# Patient Record
Sex: MALE | Race: WHITE | NOT HISPANIC OR LATINO | Employment: OTHER | URBAN - METROPOLITAN AREA
[De-identification: names, ages, dates, MRNs, and addresses within clinical notes are randomized per-mention and may not be internally consistent; named-entity substitution may affect disease eponyms.]

---

## 2018-01-16 NOTE — MISCELLANEOUS
Message  call from dr Cheryle Peterson , patient needs to be admitted to [facility] for pain control and poss infection, scheduled for next week amp  called [facility] ER and told them to call our resident upon arrival       Active Problems    1  Anemia (285 9) (D64 9)   2  Chronic kidney disease (CKD), stage II (mild) (585 2) (N18 2)   3  Hypertension (401 9) (I10)   4  Microscopic hematuria (599 72) (R31 2)   5  MPA (microscopic polyangiitis) (446 0) (M31 7)   6  Post-operative state (V45 89) (Z98 89)   7  Proteinuria (791 0) (R80 9)   8  Status post below knee amputation of left lower extremity (V49 75) (Z89 512)   9  Vasculitis, ANCA positive (447 6) (I77 6)   10  Vitamin D deficiency (268 9) (E55 9)    Current Meds   1  Biotin 5000 MCG Oral Tablet; take 1 capsule daily; Therapy: 60XZW6596 to Recorded   2  LORazepam 0 5 MG Oral Tablet; TAKE 1 TABLET 3 TIMES DAILY; Therapy: 03LQJ9555 to Recorded   3  Losartan Potassium 100 MG Oral Tablet; TAKE 1 TABLET DAILY; Therapy: 62GAV9658 to (Evaluate:08Oso5626)  Requested for: 38TAO6824; Last   Rx:[id number] Ordered   4  Magnesium Oxide 400 MG Oral Capsule; take 1 tab od; Therapy: 41KZY5266 to Recorded   5  Mirtazapine 15 MG Oral Tablet; TAKE 1 TABLET AT BEDTIME; Therapy: 99AXB0476 to Recorded   6  PredniSONE 5 MG Oral Tablet; take one tablet by mouth every day; Therapy: 54ZDG7506 to (Evaluate:69Zym2211) Recorded   7  Spironolactone 25 MG Oral Tablet; Take 1 tablet daily; Therapy: 23EFR6344 to ([phone])  Requested for: 91Qdh5008; Last   Rx:[id number]; Status: ACTIVE - Renewal Denied Ordered   8  Torsemide 10 MG Oral Tablet; take 1 tablet by mouth every day; Therapy: 36MLO1450 to (Evaluate:66Rmh8297)  Requested for: 73OLX3227; Last   Rx:92Ilz8870 Ordered   9  Vitamin B-12 1000 MCG Oral Tablet; TAKE 1 TABLET DAILY AS DIRECTED; Therapy: 04TTC6064 to Recorded   10  Vitamin D3 09975 UNIT Oral Capsule; take 1 tabl;et weekly; Therapy: 61UCI8526 to Recorded    Allergies    1  No Known Drug Allergies    Signatures   Electronically signed by :  Gris Wadsworth, ; May  6 2016 12:09PM EST                       (Author)

## 2019-10-28 LAB
CREAT ?TM UR-SCNC: 64 UMOL/L
EXT PROTEIN URINE: 28
PROT/CREAT UR: 438 MG/G{CREAT}

## 2022-03-09 ENCOUNTER — HOSPITAL ENCOUNTER (EMERGENCY)
Facility: HOSPITAL | Age: 62
Discharge: HOME | End: 2022-03-09
Attending: EMERGENCY MEDICINE | Admitting: EMERGENCY MEDICINE
Payer: COMMERCIAL

## 2022-03-09 VITALS
WEIGHT: 183 LBS | HEART RATE: 72 BPM | OXYGEN SATURATION: 97 % | HEIGHT: 72 IN | RESPIRATION RATE: 16 BRPM | DIASTOLIC BLOOD PRESSURE: 95 MMHG | BODY MASS INDEX: 24.79 KG/M2 | TEMPERATURE: 98.3 F | SYSTOLIC BLOOD PRESSURE: 183 MMHG

## 2022-03-09 DIAGNOSIS — R03.0 ELEVATED BLOOD PRESSURE READING: Primary | ICD-10-CM

## 2022-03-09 DIAGNOSIS — I10 PRIMARY HYPERTENSION: ICD-10-CM

## 2022-03-09 LAB
ALBUMIN SERPL BCP-MCNC: 4 G/DL (ref 3.5–5)
ALP SERPL-CCNC: 64 U/L (ref 34–104)
ALT SERPL W P-5'-P-CCNC: 9 U/L (ref 7–52)
ANION GAP SERPL CALCULATED.3IONS-SCNC: 10 MMOL/L (ref 4–13)
AST SERPL W P-5'-P-CCNC: 15 U/L (ref 13–39)
BASOPHILS # BLD AUTO: 0.13 THOUSANDS/ΜL (ref 0–0.1)
BASOPHILS NFR BLD AUTO: 2 % (ref 0–1)
BILIRUB SERPL-MCNC: 0.44 MG/DL (ref 0.2–1)
BUN SERPL-MCNC: 14 MG/DL (ref 5–25)
CALCIUM SERPL-MCNC: 9 MG/DL (ref 8.4–10.2)
CHLORIDE SERPL-SCNC: 102 MMOL/L (ref 96–108)
CO2 SERPL-SCNC: 24 MMOL/L (ref 21–32)
CREAT SERPL-MCNC: 0.74 MG/DL (ref 0.6–1.3)
EOSINOPHIL # BLD AUTO: 0.24 THOUSAND/ΜL (ref 0–0.61)
EOSINOPHIL NFR BLD AUTO: 3 % (ref 0–6)
ERYTHROCYTE [DISTWIDTH] IN BLOOD BY AUTOMATED COUNT: 12.1 % (ref 11.6–15.1)
GFR SERPL CREATININE-BSD FRML MDRD: 100 ML/MIN/1.73SQ M
GLUCOSE SERPL-MCNC: 84 MG/DL (ref 65–140)
HCT VFR BLD AUTO: 46.8 % (ref 36.5–49.3)
HGB BLD-MCNC: 16.8 G/DL (ref 12–17)
IMM GRANULOCYTES # BLD AUTO: 0.03 THOUSAND/UL (ref 0–0.2)
IMM GRANULOCYTES NFR BLD AUTO: 0 % (ref 0–2)
LYMPHOCYTES # BLD AUTO: 1.24 THOUSANDS/ΜL (ref 0.6–4.47)
LYMPHOCYTES NFR BLD AUTO: 15 % (ref 14–44)
MCH RBC QN AUTO: 30.5 PG (ref 26.8–34.3)
MCHC RBC AUTO-ENTMCNC: 35.9 G/DL (ref 31.4–37.4)
MCV RBC AUTO: 85 FL (ref 82–98)
MONOCYTES # BLD AUTO: 0.81 THOUSAND/ΜL (ref 0.17–1.22)
MONOCYTES NFR BLD AUTO: 10 % (ref 4–12)
NEUTROPHILS # BLD AUTO: 5.6 THOUSANDS/ΜL (ref 1.85–7.62)
NEUTS SEG NFR BLD AUTO: 70 % (ref 43–75)
NRBC BLD AUTO-RTO: 0 /100 WBCS
PLATELET # BLD AUTO: 216 THOUSANDS/UL (ref 149–390)
PMV BLD AUTO: 9.2 FL (ref 8.9–12.7)
POTASSIUM SERPL-SCNC: 4 MMOL/L (ref 3.5–5.3)
PROT SERPL-MCNC: 7 G/DL (ref 6.4–8.4)
RBC # BLD AUTO: 5.5 MILLION/UL (ref 3.88–5.62)
SODIUM SERPL-SCNC: 136 MMOL/L (ref 135–147)
WBC # BLD AUTO: 8.05 THOUSAND/UL (ref 4.31–10.16)

## 2022-03-09 PROCEDURE — 36415 COLL VENOUS BLD VENIPUNCTURE: CPT | Performed by: EMERGENCY MEDICINE

## 2022-03-09 PROCEDURE — 99283 EMERGENCY DEPT VISIT LOW MDM: CPT

## 2022-03-09 PROCEDURE — 85025 COMPLETE CBC W/AUTO DIFF WBC: CPT | Performed by: EMERGENCY MEDICINE

## 2022-03-09 PROCEDURE — 99285 EMERGENCY DEPT VISIT HI MDM: CPT | Performed by: EMERGENCY MEDICINE

## 2022-03-09 PROCEDURE — 80053 COMPREHEN METABOLIC PANEL: CPT | Performed by: EMERGENCY MEDICINE

## 2022-03-09 PROCEDURE — 93005 ELECTROCARDIOGRAM TRACING: CPT

## 2022-03-09 RX ORDER — AMLODIPINE BESYLATE 5 MG/1
5 TABLET ORAL ONCE
Status: COMPLETED | OUTPATIENT
Start: 2022-03-09 | End: 2022-03-09

## 2022-03-09 RX ORDER — AMLODIPINE BESYLATE 5 MG/1
5 TABLET ORAL DAILY
Qty: 30 TABLET | Refills: 0 | Status: SHIPPED | OUTPATIENT
Start: 2022-03-09 | End: 2022-06-23 | Stop reason: SDUPTHER

## 2022-03-09 RX ADMIN — AMLODIPINE BESYLATE 5 MG: 5 TABLET ORAL at 19:41

## 2022-03-09 NOTE — ED PROVIDER NOTES
[address] NOTE      Pt Name: Adolfo Mistry  MRN: [id number]  YOB: 1960  Date of evaluation:  3/9/2022  Provider: Malvin Dowling MD    CHIEF COMPLAINT     Chief Complaint   Patient presents with    Hypertension     pt arriving stating he had a home health aid visit today he gets them yearly and his bp was in the 200's  Pt admits to not taking his bp medicine in a couple of months          HISTORY OF PRESENT ILLNESS  (LOCATION/SYMPTOM, TIMING/ONSET, CONTEXT/SETTING, QUALITY, DURATION, MODIFYING FACTORS, SEVERITY)   Note limiting factors  Adolfo Mistry is a 62 y o  male who presents to the emergency department for hypertension  Patient ran out of his hypertensive medications about 3 months ago  He has not had them filled or been taking any meds since that time  He does tell me that he was taking his blood pressure occasionally  He has home health come out from time to time  They took his blood pressure and noted it was elevated  They recommended that he follow-up in urgent care  Patient states that he went over there, but they were concerned about his pressure had sent him here to be evaluated  On arrival, patient's pressure is elevated  He is not having any symptoms  He denies headache  He denies chest pain or shortness of breath associated with the elevated blood pressure  He denies any weakness in the extremity  HPI    Nursing notes were reviewed  REVIEW OF SYSTEMS  (2-9 systems for level 4, ten or more for level 5)     Review of Systems   Constitutional: Negative for chills and fever  HENT: Negative for congestion and sore throat  Eyes: Negative for pain and visual disturbance  Respiratory: Negative for cough and shortness of breath  Cardiovascular: Negative for chest pain and palpitations  Gastrointestinal: Negative for abdominal pain, nausea and vomiting  Genitourinary: Negative for dysuria and frequency  Musculoskeletal: Negative for arthralgias and back pain  Skin: Negative for color change and rash  Neurological: Negative for light-headedness and headaches  PAST MEDICAL HISTORY     Past Medical History:   Diagnosis Date    ESRD (end stage renal disease) ([facility] [address] )     from vasculitis- now good gfr  off of HD    Hyperlipidemia     Hypertension     Hyperthyroidism          SURGICAL HISTORY     Past Surgical History:   Procedure Laterality Date    AMPUTATION ABOVE KNEE (AKA)      CHOLECYSTECTOMY      IR CHOLANGIOGRAM/PTC WITHOUT DRAIN  3/12/2014    TONSILLECTOMY      US GUIDANCE  1/30/2014         CURRENT MEDICATIONS     Discharge Medication List as of 3/9/2022  7:33 PM            ALLERGIES   Amoxicillin-pot clavulanate      SOCIAL HISTORY     Social History     Socioeconomic History    Marital status:      Spouse name: None    Number of children: None    Years of education: None    Highest education level: None   Occupational History    None   Tobacco Use    Smoking status: Current Every Day Smoker     Packs/day: 0 50     Types: Cigarettes    Smokeless tobacco: Never Used   Substance and Sexual Activity    Alcohol use: Not Currently    Drug use: Not Currently    Sexual activity: None   Other Topics Concern    None   Social History Narrative    None     Social Determinants of Health     Financial Resource Strain: Not on file   Food Insecurity: Not on file   Transportation Needs: Not on file   Physical Activity: Not on file   Stress: Not on file   Social Connections: Not on file   Intimate Partner Violence: Not on file   Housing Stability: Not on file       Screenings       PHYSICAL EXAM  (Up to 7 for level 4, 8 or more for level 5)     Physical Exam  Vitals and nursing note reviewed  Constitutional:       Appearance: Normal appearance  He is well-developed  He is not ill-appearing  HENT:      Head: Normocephalic and atraumatic        Nose: Nose normal       Mouth/Throat: Mouth: Mucous membranes are moist       Pharynx: Oropharynx is clear  Eyes:      Extraocular Movements: Extraocular movements intact  Conjunctiva/sclera: Conjunctivae normal       Pupils: Pupils are equal, round, and reactive to light  Cardiovascular:      Rate and Rhythm: Normal rate and regular rhythm  Heart sounds: No murmur heard  Pulmonary:      Effort: Pulmonary effort is normal  No respiratory distress  Breath sounds: Normal breath sounds  Abdominal:      General: Abdomen is flat  Bowel sounds are normal  There is no distension  Palpations: Abdomen is soft  Tenderness: There is no abdominal tenderness  Musculoskeletal:         General: Normal range of motion  Cervical back: Normal range of motion and neck supple  Right lower leg: No edema  Left lower leg: No edema  Skin:     General: Skin is warm and dry  Capillary Refill: Capillary refill takes less than 2 seconds  Neurological:      General: No focal deficit present  Mental Status: He is alert and oriented to person, place, and time  Cranial Nerves: No cranial nerve deficit  Psychiatric:         Mood and Affect: Mood normal          Behavior: Behavior normal          Thought Content:  Thought content normal          Diagnostic results   Radiology:  Non plain film images suggest CT, ultrasound and MRI are read by the radiologist   Plain radiographic images are visualized and preliminarily interpreted by the emergency physician see below findings:        EKG:  All EKGs are interpreted by the emergency department physician who either signs or cosigned discharge in the absence of a cardiologist       ECG 12 Lead Documentation Only    Date/Time: 3/9/2022 6:59 PM  Performed by: Maco Avendaño MD  Authorized by: Maco Avendaño MD     Indications / Diagnosis:  Essential HTN  ECG reviewed by me, the ED Provider: yes    Patient location:  ED  Previous ECG:     Previous ECG: Unavailable    Comparison to cardiac monitor: Yes    Rate:     ECG rate:  76    ECG rate assessment: normal    Rhythm:     Rhythm: sinus rhythm    Ectopy:     Ectopy: none    QRS:     QRS axis:  Left  Conduction:     Conduction: normal    ST segments:     ST segments:  Normal        No orders to display         LABS:  Results Reviewed     Procedure Component Value Units Date/Time    Comprehensive metabolic panel [[id number]] Collected: 03/09/22 1726    Lab Status: Final result Specimen: Blood from Hand, Right Updated: 03/09/22 1800     Sodium 136 mmol/L      Potassium 4 0 mmol/L      Chloride 102 mmol/L      CO2 24 mmol/L      ANION GAP 10 mmol/L      BUN 14 mg/dL      Creatinine 0 74 mg/dL      Glucose 84 mg/dL      Calcium 9 0 mg/dL      AST 15 U/L      ALT 9 U/L      Alkaline Phosphatase 64 U/L      Total Protein 7 0 g/dL      Albumin 4 0 g/dL      Total Bilirubin 0 44 mg/dL      eGFR 100 ml/min/1 73sq m     Narrative:      [place] guidelines for Chronic Kidney Disease (CKD):     Stage 1 with normal or high GFR (GFR > 90 mL/min/1 73 square meters)    Stage 2 Mild CKD (GFR = 60-89 mL/min/1 73 square meters)    Stage 3A Moderate CKD (GFR = 45-59 mL/min/1 73 square meters)    Stage 3B Moderate CKD (GFR = 30-44 mL/min/1 73 square meters)    Stage 4 Severe CKD (GFR = 15-29 mL/min/1 73 square meters)    Stage 5 End Stage CKD (GFR <15 mL/min/1 73 square meters)  Note: GFR calculation is accurate only with a steady state creatinine    CBC and differential [[id number]]  (Abnormal) Collected: 03/09/22 1726    Lab Status: Final result Specimen: Blood from Hand, Right Updated: 03/09/22 1733     WBC 8 05 Thousand/uL      RBC 5 50 Million/uL      Hemoglobin 16 8 g/dL      Hematocrit 46 8 %      MCV 85 fL      MCH 30 5 pg      MCHC 35 9 g/dL      RDW 12 1 %      MPV 9 2 fL      Platelets 548 Thousands/uL      nRBC 0 /100 WBCs      Neutrophils Relative 70 %      Immat GRANS % 0 %      Lymphocytes Relative 15 %      Monocytes Relative 10 %      Eosinophils Relative 3 %      Basophils Relative 2 %      Neutrophils Absolute 5 60 Thousands/µL      Immature Grans Absolute 0 03 Thousand/uL      Lymphocytes Absolute 1 24 Thousands/µL      Monocytes Absolute 0 81 Thousand/µL      Eosinophils Absolute 0 24 Thousand/µL      Basophils Absolute 0 13 Thousands/µL           All other labs were within normal range or not returned as of this dictation  EMERGENCY DEPARTMENT COURSE AND DIFFERENTIAL DIAGNOSIS/MDM:   VITALS:   Vitals:    03/09/22 1747 03/09/22 1851 03/09/22 1900 03/09/22 1945   BP: (!) 196/102 (!) 188/94 (!) 199/96 (!) 183/95   BP Location: Right arm Right arm Right arm Right arm   Pulse: 66 69 66 72   Resp: 16 18 16 16   Temp:  98 3 °F (36 8 °C)     TempSrc:  Oral     SpO2: 98% 97% 96% 97%   Weight:       Height:           MDM  Number of Diagnoses or Management Options  Elevated blood pressure reading: new and requires workup  Primary hypertension: new and requires workup  Diagnosis management comments: Patient presented this evening for elevated blood pressure readings  At no point has he had chest pain, shortness of breath, headache, weakness in any extremity or vision changes  He is asymptomatic  As he was in the ED his blood pressure has fluctuated  He has had essentially no complaints  Blood work this evening is unremarkable  He tells me that he is basically out of his hypertensive medicine  I am going to start him back on amlodipine at 5 mg  I told him he needs to call his primary care  After careful review history, physical and supporting data, there is a very low suspicion for life or limb threatening cause the patient's essential hypertension  The patient's symptoms have improved from presentation and appears clinically well  Patient re-examined prior to discharge and appears improved    Patient has been encouraged to follow up with his/her PCP and return to the ED with any lack of improvement or worsening symptoms  The patient's questions regarding the workup and plan were invited and answered  The guardian/patient states understanding and agreement with discharge planning  Patient Progress  Patient progress: stable      Reassessment:      Medications   amLODIPine (NORVASC) tablet 5 mg (5 mg Oral Given 3/9/22 1941)       CONSULTS:    Unless otherwise noted below none  FINAL IMPRESSION     1  Elevated blood pressure reading    2   Primary hypertension            DISPOSITION/PLAN         PATIENT REFERRED TO:  Gianna Rene MD  [address] [place] [address] [phone]    Call in 2 days  As needed        DISCHARGE MEDICATIONS:  Discharge Medication List as of 3/9/2022  7:33 PM      START taking these medications    Details   amLODIPine (NORVASC) 5 mg tablet Take 1 tablet (5 mg total) by mouth daily, Starting Wed 3/9/2022, Normal                 (Please note that the portions of the snow were completed with voice recognition program   Efforts were made to admit the dictations but occasionally words are missed transcribed )    Forest Blount MD (electronically signed) emergency physician                             Forest Blount MD  03/10/22 0004

## 2022-03-11 LAB
ATRIAL RATE: 76 BPM
ATRIAL RATE: 76 BPM
P AXIS: 73 DEGREES
P AXIS: 73 DEGREES
PR INTERVAL: 171 MS
PR INTERVAL: 171 MS
QRS AXIS: -7 DEGREES
QRS AXIS: -7 DEGREES
QRSD INTERVAL: 96 MS
QRSD INTERVAL: 96 MS
QT INTERVAL: 390 MS
QT INTERVAL: 390 MS
QTC INTERVAL: 439 MS
QTC INTERVAL: 439 MS
T WAVE AXIS: 58 DEGREES
T WAVE AXIS: 58 DEGREES
VENTRICULAR RATE: 76 BPM
VENTRICULAR RATE: 76 BPM

## 2022-03-11 PROCEDURE — 93010 ELECTROCARDIOGRAM REPORT: CPT | Performed by: INTERNAL MEDICINE

## 2022-04-26 ENCOUNTER — TELEPHONE (OUTPATIENT)
Dept: NEPHROLOGY | Facility: CLINIC | Age: 62
End: 2022-04-26

## 2022-04-26 NOTE — TELEPHONE ENCOUNTER
New Patient Intake Form   Patient Details   Froy Amezquita     1960     [id number]     Appointment Information   Who is calling to schedule? If not patient, what is callers name? Patient    Referring Provider  self   Referring Provider Number n/a   Reason for Appt (Diagnosis) Abnormal labs   Is patient aware of why they are being referred? yes   Does Patient have labs done at [place] [address]? If not, where do they go? no - [facility]   Has patient had labs / urine work done? List date of most recent lab / urine work yes - April 2022   Has patient had a BMP & CBC done in the past 2 years? If so, list the date yes    Has patient been hospitalized recently? If yes, list name and location of hospital they were in no    Has patient been seen by a Nephrologist before? If yes, list name, location and phone number  yes- Dr Tee Bean   Has patient been see by another Specialty before (ex  Neurology, urology, cardiology)? If yes, please list name, and specialty No - will be seeing urology    Has the patient had imaging done? If so, list the most recent date and type of imaging no    Does the patient has a stone analysis report if history of kidney stones?  no   Appointment Details   Is there a referral on file? no    Appointment Date  6/23   [facility]

## 2022-06-23 ENCOUNTER — OFFICE VISIT (OUTPATIENT)
Dept: NEPHROLOGY | Facility: CLINIC | Age: 62
End: 2022-06-23
Payer: MEDICARE

## 2022-06-23 VITALS
SYSTOLIC BLOOD PRESSURE: 160 MMHG | BODY MASS INDEX: 25.33 KG/M2 | HEART RATE: 72 BPM | DIASTOLIC BLOOD PRESSURE: 82 MMHG | HEIGHT: 72 IN | WEIGHT: 187 LBS

## 2022-06-23 DIAGNOSIS — R31.29 MICROSCOPIC HEMATURIA: ICD-10-CM

## 2022-06-23 DIAGNOSIS — I10 PRIMARY HYPERTENSION: ICD-10-CM

## 2022-06-23 DIAGNOSIS — M31.7 MICROSCOPIC POLYANGIITIS (HCC): Primary | ICD-10-CM

## 2022-06-23 DIAGNOSIS — R80.1 PERSISTENT PROTEINURIA: ICD-10-CM

## 2022-06-23 PROCEDURE — 99204 OFFICE O/P NEW MOD 45 MIN: CPT | Performed by: INTERNAL MEDICINE

## 2022-06-23 RX ORDER — AMLODIPINE BESYLATE 10 MG/1
10 TABLET ORAL DAILY
Qty: 30 TABLET | Refills: 5 | Status: SHIPPED | OUTPATIENT
Start: 2022-06-23

## 2022-06-23 RX ORDER — TAMSULOSIN HYDROCHLORIDE 0.4 MG/1
0.4 CAPSULE ORAL EVERY 24 HOURS
COMMUNITY

## 2022-06-23 RX ORDER — ATORVASTATIN CALCIUM 20 MG/1
20 TABLET, FILM COATED ORAL EVERY 24 HOURS
COMMUNITY

## 2022-06-23 RX ORDER — OLMESARTAN MEDOXOMIL 20 MG/1
1 TABLET ORAL DAILY
COMMUNITY
End: 2022-07-24 | Stop reason: SDUPTHER

## 2022-06-23 NOTE — PROGRESS NOTES
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 in 2014-15, status post renal biopsy that time and also required briefly dialysis  -as per chart review, patient also had plasma exchange, steroids and cyclophosphamide that time   -since then overall had significant renal recovery with most recent creatinine 0 7 in April 2022  -unfortunately he lost insurance and was not taking any medications for 1 5 years up until last couple of months when he restarted taking all medications  -currently remains on azathioprine which was restarted in May 2022 by Rheumatology    -UA in the office today shows 1+ proteinuria, 5 to 10 blood  -recent UA in April 2022 shows multiple WBCs, 3 to 10 RBCs, positive leukocyte esterase, 3+ proteinuria   -most recent PR3 antibody 46 in April 2022 (prior 98 in 2016)  -given more recent UA showing microscopic hematuria along with proteinuria with elevated PR3 antibody, raises suspicion for ongoing ANCA vasculitis activity  -fortunately renal function remains stable  -will do repeat serological workup including ANCA panel, KAYLEE, complements, hep panel, repeat CMP, SPEP, UPEP  -if continued to have persistent hematuria/proteinuria along with positive PR3 antibody, may consider renal biopsy    Microscopic hematuria/proteinuria  -further workup as above  -suspicious for ANCA vasculitis, if continued to have microscopic hematuria along with positive PR3 antibody, proceed with renal biopsy   -would like to have better BP control prior to renal biopsy    Hypertension  -blood pressure above goal in the office today  -currently on amlodipine 10 mg daily, olmesartan 20 mg daily, Flomax  -patient has not done repeat lab since starting Olmesartan recently  -we will do CMP ASAP and if renal function remains stable, plan to increase olmesartan further  -he has apparent BP machine at home although does not check BP on a regular basis  Strongly recommended to check BP and call back if BP remains persistently greater than 140/90  Also bring BP machine during next office visit  -salt restricted diet recommended     Microscopic polyangiitis, follows with Rheumatology Dr Mariaelena Benitez  -now restarted on azathioprine in May 22    HISTORY OF PRESENT ILLNESS:  Arden Cornejo is a 65y o  year old male with medical issues of hypertension diagnosed recently, hyperlipidemia, history of microscopic polyangiitis, mononeuritis multiplex, episode of SHONNA/RPGN requiring dialysis in 2014-15, steroid induced hyperglycemia, not on any antidiabetic medications currently, history of kidney stones requiring lithotripsy in 2010, left AKA in 2016 who presents for initial consultation for hematuria, proteinuria  Old medical records including prior lab values, rheumatology notes were reviewed  Patient apparently had kidney biopsy when he had episode of SHONNA requiring dialysis in 2014 15  Also required steroids, cyclophosphamide, plasma exchange that time  Since then had good renal recovery with most recent creatinine overall stable as mentioned above  There is also documented significant noncompliance with taking meds, following appointments and also labs  Patient mentions that he lost insurance for about 1 5 years and was not taking any medications up until last couple of months ago when he restarted taking all medications after receiving his insurance back  He was restarted on azathioprine by Rheumatology recently  He admits to smoking half pack per day  Also was found to have elevated blood pressure recently and also was started on Olmesartan  Does not check BP at home  Currently denies any urinary complaint      Denies any new skin rash     REVIEW OF SYSTEMS:    More than 10 point review of systems were obtained and discussed in length with the patient  Complete review of systems were negative / unremarkable except mentioned in the HPI section  PHYSICAL EXAM:  Vitals:    06/23/22 1500 06/23/22 1543   BP:  160/82   Pulse:  72   Weight: 84 8 kg (187 lb)    Height: 6' (1 829 m)      Body mass index is 25 36 kg/m²  Physical Exam  Vitals reviewed  Constitutional:       Appearance: He is well-developed  HENT:      Head: Normocephalic and atraumatic  Right Ear: External ear normal       Left Ear: External ear normal       Nose:      Comments: External examination of nose unremarkable  Eyes:      General: No scleral icterus  Conjunctiva/sclera: Conjunctivae normal    Cardiovascular:      Comments: S1, S2 present  Pulmonary:      Effort: Pulmonary effort is normal  No respiratory distress  Breath sounds: Normal breath sounds  No wheezing or rales  Abdominal:      General: Bowel sounds are normal  There is no distension  Palpations: Abdomen is soft  Tenderness: There is no abdominal tenderness  Musculoskeletal:         General: Deformity (History of left AKA, currently have prosthesis) present  Right lower leg: No edema  Lymphadenopathy:      Cervical: No cervical adenopathy  Skin:     Findings: No rash  Neurological:      Mental Status: He is alert and oriented to person, place, and time     Psychiatric:         Behavior: Behavior normal          PAST MEDICAL HISTORY:  Past Medical History:   Diagnosis Date    ESRD (end stage renal disease) ([facility] [address] )     from vasculitis- now good gfr  off of HD    Hyperlipidemia     Hypertension     Hyperthyroidism        PAST SURGICAL HISTORY:  Past Surgical History:   Procedure Laterality Date    AMPUTATION ABOVE KNEE (AKA)      CHOLECYSTECTOMY      IR CHOLANGIOGRAM/PTC WITHOUT DRAIN  3/12/2014    TONSILLECTOMY      US GUIDANCE  1/30/2014 ALLERGIES:  Allergies   Allergen Reactions    Amoxicillin-Pot Clavulanate Other (See Comments)       SOCIAL HISTORY:  Social History     Substance and Sexual Activity   Alcohol Use Not Currently     Social History     Substance and Sexual Activity   Drug Use Not Currently     Social History     Tobacco Use   Smoking Status Current Every Day Smoker    Packs/day: 0 50    Types: Cigarettes   Smokeless Tobacco Never Used       FAMILY HISTORY:  Family History   Problem Relation Age of Onset    Cirrhosis Father         liver        MEDICATIONS:    Current Outpatient Medications:     amLODIPine (NORVASC) 10 mg tablet, Take 1 tablet (10 mg total) by mouth daily, Disp: 30 tablet, Rfl: 5    atorvastatin (LIPITOR) 20 mg tablet, Take 20 mg by mouth every 24 hours, Disp: , Rfl:     azaTHIOprine 50 mg/mL in flavored oral syrup vehicle, Take 100 mg by mouth in the morning, Disp: , Rfl:     MULTIPLE VITAMIN PO, every 24 hours, Disp: , Rfl:     olmesartan (BENICAR) 20 mg tablet, Take 1 tablet by mouth daily, Disp: , Rfl:     tamsulosin (FLOMAX) 0 4 mg, Take 0 4 mg by mouth every 24 hours, Disp: , Rfl:     VITAMIN D PO, Take 1,000 Units by mouth every 24 hours, Disp: , Rfl:     Lab Results:   Results for orders placed or performed in visit on 04/22/22   Protein / creatinine ratio, urine   Result Value Ref Range    PROTEIN      EXT Creatinine Urine 155     EXTERNAL Ur Prot/Creat Ratio 1,523    Hepatitis C antibody   Result Value Ref Range    HEP C AB NEGATIVE      Portions of the record may have been created with voice recognition software  Occasional wrong word or "sound a like" substitutions may have occurred due to the inherent limitations of voice recognition software  Read the chart carefully and recognize, using context, where substitutions have occurred

## 2022-06-24 ENCOUNTER — HOSPITAL ENCOUNTER (OUTPATIENT)
Dept: ULTRASOUND IMAGING | Facility: HOSPITAL | Age: 62
Discharge: HOME | End: 2022-06-24
Attending: INTERNAL MEDICINE
Payer: MEDICARE

## 2022-06-24 DIAGNOSIS — R31.29 MICROSCOPIC HEMATURIA: ICD-10-CM

## 2022-06-24 DIAGNOSIS — R80.1 PERSISTENT PROTEINURIA: ICD-10-CM

## 2022-06-24 PROCEDURE — 76770 US EXAM ABDO BACK WALL COMP: CPT

## 2022-06-29 ENCOUNTER — TELEPHONE (OUTPATIENT)
Dept: NEPHROLOGY | Facility: CLINIC | Age: 62
End: 2022-06-29

## 2022-06-29 DIAGNOSIS — R80.1 PERSISTENT PROTEINURIA: Primary | ICD-10-CM

## 2022-06-29 DIAGNOSIS — R31.29 MICROSCOPIC HEMATURIA: ICD-10-CM

## 2022-06-29 DIAGNOSIS — I10 PRIMARY HYPERTENSION: ICD-10-CM

## 2022-06-29 NOTE — TELEPHONE ENCOUNTER
Please let him know that renal ultrasound shows no acute findings  Will discuss in more detail during office visit  Also I had prescribed multiple labs for him during office visit which he should do asap

## 2022-06-30 NOTE — TELEPHONE ENCOUNTER
Spoke with patient and advised: Please let him know that renal ultrasound shows no acute findings  Will discuss in more detail during office visit  Also I had prescribed multiple labs for him during office visit which he should do asap  [facility], patient stated he will get them done after the holiday

## 2022-07-06 ENCOUNTER — TELEPHONE (OUTPATIENT)
Dept: NEPHROLOGY | Facility: CLINIC | Age: 62
End: 2022-07-06

## 2022-07-06 NOTE — TELEPHONE ENCOUNTER
----- Message from Wood Hinkle MD sent at 7/6/2022  4:28 PM EDT -----  I saw some of his lab results came back but I do not see CMP and UA with microscopy that were done  He needs to do this along with other labs that he has already done  Not sure if labs can add on to existing specimen but if not, unfortunately patient will have to go again to lab to get CMP and UA with microscopy    Please let me know thank you

## 2022-07-08 LAB
ALBUMIN ?TM MFR UR ELPH: 68 %
ALBUMIN SERPL ELPH-MCNC: 4.2 G/DL (ref 3.8–4.8)
ALBUMIN/CREAT UR: 141 MCG/MG CREAT
ALPHA1 GLOB ?TM MFR UR ELPH: 6 %
ALPHA1 GLOB SERPL ELPH-MCNC: 0.3 G/DL (ref 0.2–0.3)
ALPHA2 GLOB ?TM MFR UR ELPH: 8 %
ALPHA2 GLOB SERPL ELPH-MCNC: 0.7 G/DL (ref 0.5–0.9)
ANA SER QL IF: NEGATIVE
B-GLOBULIN ?TM MFR UR ELPH: 13 %
BETA1 GLOB SERPL ELPH-MCNC: 0.5 G/DL (ref 0.4–0.6)
BETA2 GLOB SERPL ELPH-MCNC: 0.4 G/DL (ref 0.2–0.5)
C3 SERPL-MCNC: 115 MG/DL (ref 82–185)
C4 SERPL-MCNC: 31 MG/DL (ref 15–53)
CREAT UR-MCNC: 96 MG/DL (ref 20–320)
CREAT UR-MCNC: 96 MG/DL (ref 20–320)
GAMMA GLOB ?TM MFR UR ELPH: 7 %
GAMMA GLOB SERPL ELPH-MCNC: 0.9 G/DL (ref 0.8–1.7)
HAV AB SER QL IA: NORMAL
HBA1C MFR BLD: 6 % OF TOTAL HGB
HBV CORE AB SERPL QL IA: NORMAL
HBV SURFACE AB SER QL IA: NORMAL
HBV SURFACE AG SERPL QL IA: NORMAL
HCV AB S/CO SERPL IA: 0.02
HCV AB SERPL QL IA: NORMAL
MICROALBUMIN UR-MCNC: 13.5 MG/DL
MYELOPEROXIDASE AB SER IA-ACNC: <1 AI
PROT SERPL-MCNC: 7.1 G/DL (ref 6.1–8.1)
PROT UR-MCNC: 33 MG/DL (ref 5–25)
PROT/CREAT UR: 0.34 MG/MG CREAT (ref 0.02–0.13)
PROT/CREAT UR: 344 MG/G CREAT (ref 22–128)
PROTEINASE3 AB SER IA-ACNC: 40.3 AI

## 2022-07-11 NOTE — TELEPHONE ENCOUNTER
Can you please follow-up with patient regarding prior telephone call on 7/6/22? I wanted him to go for repeat CMP, UA with microscopy which he needs to do asap to make a plan whether he will need renal biopsy  Orders are already in the system

## 2022-07-11 NOTE — TELEPHONE ENCOUNTER
Called patient to get an update  Patient's mailbox was full no message was left  Will try calling back tomorrow

## 2022-07-19 ENCOUNTER — TELEPHONE (OUTPATIENT)
Dept: NEPHROLOGY | Facility: CLINIC | Age: 62
End: 2022-07-19

## 2022-07-19 NOTE — TELEPHONE ENCOUNTER
Can you please follow-up with him regarding doing CMP, UA with microscopy? This was recommended on telephone call note on 7/11/22 to do this

## 2022-07-21 ENCOUNTER — DOCUMENTATION (OUTPATIENT)
Dept: NEPHROLOGY | Facility: CLINIC | Age: 62
End: 2022-07-21

## 2022-07-21 LAB
ALBUMIN SERPL-MCNC: 4.2 G/DL (ref 3.6–5.1)
ALBUMIN/GLOB SERPL: 1.8 (CALC) (ref 1–2.5)
ALP SERPL-CCNC: 67 U/L (ref 35–144)
ALT SERPL-CCNC: 12 U/L (ref 9–46)
APPEARANCE UR: CLEAR
AST SERPL-CCNC: 13 U/L (ref 10–35)
BACTERIA UR QL AUTO: ABNORMAL /HPF
BILIRUB SERPL-MCNC: 0.6 MG/DL (ref 0.2–1.2)
BILIRUB UR QL STRIP: NEGATIVE
BILIRUB UR QL STRIP: NEGATIVE
BUN SERPL-MCNC: 17 MG/DL (ref 7–25)
BUN/CREAT SERPL: ABNORMAL (CALC) (ref 6–22)
CALCIUM SERPL-MCNC: 9.3 MG/DL (ref 8.6–10.3)
CHARACTER SMN: CLEAR
CHLORIDE SERPL-SCNC: 107 MMOL/L (ref 98–110)
CO2 SERPL-SCNC: 28 MMOL/L (ref 20–32)
COLOR UR: ABNORMAL
COLOR UR: YELLOW
CREAT SERPL-MCNC: 0.84 MG/DL (ref 0.7–1.35)
EXT GLUCOSE BLD: 125
EXTERNAL ALBUMIN: 4.2
EXTERNAL ALK PHOS: 71
EXTERNAL ALT: 13
EXTERNAL AST: 17
EXTERNAL BUN: 16
EXTERNAL CALCIUM: 9.4
EXTERNAL CHLORIDE: 106
EXTERNAL CREATININE: 0.8
EXTERNAL EGFR: 96
EXTERNAL POTASSIUM: 4.8
EXTERNAL SODIUM: 139
EXTERNAL T.BILIRUBIN: 0.8
EXTERNAL TOTAL PROTEIN: 7
GFR/BSA.PRED SERPLBLD CYS-BASED-ARV: 99 ML/MIN/1.73M2
GLOBULIN SER CALC-MCNC: 2.3 G/DL (CALC) (ref 1.9–3.7)
GLUCOSE SERPL-MCNC: 124 MG/DL (ref 65–99)
GLUCOSE UR QL STRIP: NEGATIVE
HCT VFR BLD AUTO: 47.7 % (ref 36.5–49.3)
HGB BLD-MCNC: 16.5 G/DL (ref 12–17)
HGB UR QL STRIP.AUTO: NEGATIVE
HGB UR QL STRIP: NEGATIVE
HYALINE CASTS #/AREA URNS LPF: ABNORMAL /LPF
KETONES UR QL STRIP: ABNORMAL
KETONES UR STRIP-MCNC: NEGATIVE MG/DL
LEUKOCYTE ESTERASE UR QL STRIP: NEGATIVE
NITRITE UR QL STRIP: NEGATIVE
NITRITE UR QL STRIP: NEGATIVE
PH SMN: 7.5 [PH]
PH UR STRIP: 5.5 [PH] (ref 5–8)
POTASSIUM SERPL-SCNC: 4.6 MMOL/L (ref 3.5–5.3)
PROT SERPL-MCNC: 6.5 G/DL (ref 6.1–8.1)
PROT UR QL STRIP: ABNORMAL
PROT UR STRIP-MCNC: ABNORMAL MG/DL
RBC # BLD AUTO: 5.31 MILLION/UL (ref 3.88–5.62)
RBC #/AREA URNS HPF: ABNORMAL /HPF
SODIUM SERPL-SCNC: 141 MMOL/L (ref 135–146)
SP GR UR STRIP.AUTO: 1.02 (ref 1–1.03)
SP GR UR STRIP: 1.02 (ref 1–1.03)
SQUAMOUS #/AREA URNS HPF: ABNORMAL /HPF
WBC # BLD AUTO: 6 THOUSAND/UL (ref 4.31–10.16)
WBC #/AREA URNS HPF: ABNORMAL /HPF

## 2022-07-24 ENCOUNTER — TELEPHONE (OUTPATIENT)
Dept: OTHER | Facility: HOSPITAL | Age: 62
End: 2022-07-24

## 2022-07-24 DIAGNOSIS — I10 PRIMARY HYPERTENSION: Primary | ICD-10-CM

## 2022-07-24 RX ORDER — OLMESARTAN MEDOXOMIL 20 MG/1
20 TABLET ORAL 2 TIMES DAILY
Qty: 60 TABLET | Refills: 5 | Status: SHIPPED | OUTPATIENT
Start: 2022-07-24 | End: 2022-07-26

## 2022-07-24 NOTE — TELEPHONE ENCOUNTER
His Cr remains stable and UA unremarkable  Please have him increase Olmesartan from 20 mg daily to 20 mg BID  Refill sent to pharmacy  I will discuss with his rheumatology meanwhile given persistent positive Ab level  He should bring home BP machine to office visit next month  Also monitor BP daily at home and bring all BP log to office visit

## 2022-07-25 NOTE — TELEPHONE ENCOUNTER
Attempted to call patient to advise: His Cr remains stable and UA unremarkable  Please have him increase Olmesartan from 20 mg daily to 20 mg BID  Refill sent to pharmacy  I will discuss with his rheumatology meanwhile given persistent positive Ab level  He should bring home BP machine to office visit next month  Also monitor BP daily at home and bring all BP log to office visit  Patient's mailbox was full  Will try again later

## 2022-07-26 ENCOUNTER — TELEPHONE (OUTPATIENT)
Dept: OTHER | Facility: HOSPITAL | Age: 62
End: 2022-07-26

## 2022-07-26 DIAGNOSIS — R31.29 MICROSCOPIC HEMATURIA: ICD-10-CM

## 2022-07-26 DIAGNOSIS — R80.1 PERSISTENT PROTEINURIA: ICD-10-CM

## 2022-07-26 DIAGNOSIS — I10 PRIMARY HYPERTENSION: Primary | ICD-10-CM

## 2022-07-26 NOTE — TELEPHONE ENCOUNTER
Attempted to call patient a second time to advise: His Cr remains stable and UA unremarkable  Please have him increase Olmesartan from 20 mg daily to 20 mg BID  Refill sent to pharmacy  I will discuss with his rheumatology meanwhile given persistent positive Ab level  He should bring home BP machine to office visit next month  Also monitor BP daily at home and bring all BP log to office visit       Patient's mailbox was full  Will try again later

## 2022-07-26 NOTE — TELEPHONE ENCOUNTER
Please let patient know that I had discussed with Dr Hilario Aiken (rheumatologist) regarding his most recent lab results  Given his renal function remains very stable with most recent urine sample shows no hematuria, overall much improving proteinuria, will continue to monitor all his lab parameters closely and if any worsening of hematuria/proteinuria/renal function, will consider renal biopsy that point  Please have him do repeat CMP, upc ratio, UA with microscopy, ANCA panel in November 2022  Of note, as per rheumatologist, his prior upc ratio 1 5 g in April 2022 with seems to have much improved to 0 3 g most recent

## 2022-08-10 ENCOUNTER — TELEPHONE (OUTPATIENT)
Dept: NEPHROLOGY | Facility: CLINIC | Age: 62
End: 2022-08-10

## 2022-08-10 NOTE — TELEPHONE ENCOUNTER
Appointment Confirmation   Person confirmed appointment with  If not patient, name of the person Child  Evorn Burn   Date and time of appointment 8/11  12:00    Patient acknowledged and will be at appointment? no    Did you advise the patient that they will need a urine sample if they are a new patient?  N/A    Did you advise the patient to bring their current medications for verification? (including any OTC) Yes    Additional Information

## 2022-08-11 ENCOUNTER — TELEPHONE (OUTPATIENT)
Dept: NEPHROLOGY | Facility: CLINIC | Age: 62
End: 2022-08-11

## 2022-08-11 ENCOUNTER — OFFICE VISIT (OUTPATIENT)
Dept: NEPHROLOGY | Facility: CLINIC | Age: 62
End: 2022-08-11
Payer: COMMERCIAL

## 2022-08-11 VITALS
SYSTOLIC BLOOD PRESSURE: 158 MMHG | BODY MASS INDEX: 25.19 KG/M2 | WEIGHT: 186 LBS | DIASTOLIC BLOOD PRESSURE: 84 MMHG | HEIGHT: 72 IN

## 2022-08-11 DIAGNOSIS — M31.7 MICROSCOPIC POLYANGIITIS (HCC): Primary | ICD-10-CM

## 2022-08-11 DIAGNOSIS — I10 PRIMARY HYPERTENSION: ICD-10-CM

## 2022-08-11 DIAGNOSIS — R80.1 PERSISTENT PROTEINURIA: ICD-10-CM

## 2022-08-11 DIAGNOSIS — R31.29 MICROSCOPIC HEMATURIA: ICD-10-CM

## 2022-08-11 PROCEDURE — 99214 OFFICE O/P EST MOD 30 MIN: CPT | Performed by: INTERNAL MEDICINE

## 2022-08-11 RX ORDER — ASPIRIN 81 MG/1
81 TABLET, CHEWABLE ORAL DAILY
COMMUNITY

## 2022-08-11 RX ORDER — OLMESARTAN MEDOXOMIL 40 MG/1
40 TABLET ORAL DAILY
Qty: 30 TABLET | Refills: 5 | Status: SHIPPED | OUTPATIENT
Start: 2022-08-11 | End: 2022-09-05

## 2022-08-11 RX ORDER — GABAPENTIN 100 MG/1
100 CAPSULE ORAL 2 TIMES DAILY
COMMUNITY

## 2022-08-11 NOTE — TELEPHONE ENCOUNTER
Patient was seen today 08/11 and Dr Bryson Murillo requested a 3 month follow up but he is completely booked  Please advise

## 2022-08-11 NOTE — PROGRESS NOTES
NEPHROLOGY OUTPATIENT PROGRESS NOTE   Darshana Borges 65 y o  male MRN: [id number]  DATE: 8/11/2022  Reason for visit:   Chief Complaint   Patient presents with    Follow-up    Proteinuria     ASSESSMENT and PLAN:  History of microscopic polyangiitis   -patient has history of SHONNA/RPGN secondary to microscopic polyangiitis in 2014-15, status post renal biopsy that time and also briefly required dialysis  -as per chart review, patient also had plasma exchange, steroids and cyclophosphamide that time   -since then overall had significant renal recovery with most recent creatinine 0 8 in July 2022   -currently remains on azathioprine which was restarted in May 2022 by Rheumatology  -UA in July 2022 shows no hematuria, 1+ proteinuria    -most recent PR3 antibody 40 in July 2022 <-- 55 in April 2022 (prior 98 in 2016)  -due to very stable renal function, no hematuria on more recent UA, improving proteinuria, we will continue to monitor conservatively  Also discussed with Rheumatology recently who agrees with this plan  -closely monitor Anca panel, UA with microscopy, BMP before next visit  -we will have low threshold for renal biopsy if has any worsening hematuria/proteinuria with renal failure   -recent serological workup shows normal complements, PR3 antibody 40 3, KAYLEE negative, SPEP negative in July 2022     Microscopic hematuria/proteinuria  -most recent UA shows no hematuria  -further workup as above  -monitor with repeat urine studies before next visit  Proteinuria  -urine microalbumin/creatinine ratio 141 mg in July 2022  -upc ratio 1 5 g in April 2022 improving to 0 3 g in July 2022  -increasing Olmesartan as below     Hypertension  -blood pressure improved on repeat check although still remains above goal   -recently Olmesartan was increased to 40 mg daily although patient still takes 20 mg daily   -continue current amlodipine 10 mg daily, increase Olmesartan to 40 mg daily     -has BP machine at home although does not check BP on a regular basis  Strongly recommended to check BP and call back if BP remains persistently greater than 140/90  Also bring BP machine during next office visit  -salt restricted diet recommended      Microscopic polyangiitis, follows with Rheumatology Dr Hilario Aiken  -now restarted on azathioprine in May 22    Diagnoses and all orders for this visit:    Microscopic polyangiitis ([address] )    Primary hypertension  -     olmesartan (BENICAR) 40 mg tablet; Take 1 tablet (40 mg total) by mouth daily    Persistent proteinuria  -     olmesartan (BENICAR) 40 mg tablet; Take 1 tablet (40 mg total) by mouth daily    Microscopic hematuria    Other orders  -     gabapentin (NEURONTIN) 100 mg capsule; Take 100 mg by mouth 2 (two) times a day  -     aspirin 81 mg chewable tablet; Chew 81 mg daily          SUBJECTIVE / HPI:  Jac Howard is a 65y o  year old male with medical issues of hypertension diagnosed recently, hyperlipidemia, history of microscopic polyangiitis, mononeuritis multiplex, episode of SHONNA/RPGN requiring dialysis in 2014-15, steroid induced hyperglycemia, not on any antidiabetic medications currently, history of kidney stones requiring lithotripsy in 2010, left AKA in 2016 who presents for regular follow-up of proteinuria/hematuria  Patient apparently had kidney biopsy when he had episode of SHONNA requiring dialysis in 2014 15  Also required steroids, cyclophosphamide, plasma exchange that time  Since then had good renal recovery with most recent creatinine overall stable as mentioned above    he lost insurance for about 1 5 years and was not taking any medications up until earlier this year when he restarted taking all medications after receiving his insurance back  Since last visit, recently his olmesartan was increased although he still continues to take 20 mg daily  Currently remains on azathioprine  Smokes half pack per day  Currently denies any urinary complaint    Denies any new skin rash  REVIEW OF SYSTEMS:  More than 10 point review of systems were obtained and discussed in length with the patient  Complete review of systems were negative / unremarkable except mentioned above  PHYSICAL EXAM:  Vitals:    08/11/22 1139 08/11/22 1210   BP: (!) 180/86 158/84   BP Location: Left arm    Patient Position: Sitting    Cuff Size: Standard    Weight: 84 4 kg (186 lb)    Height: 6' (1 829 m)      Body mass index is 25 23 kg/m²  Physical Exam  Vitals reviewed  Constitutional:       Appearance: He is well-developed  HENT:      Head: Normocephalic and atraumatic  Right Ear: External ear normal       Left Ear: External ear normal    Eyes:      General: No scleral icterus  Conjunctiva/sclera: Conjunctivae normal    Cardiovascular:      Comments: S1, S2 present  Pulmonary:      Effort: Pulmonary effort is normal  No respiratory distress  Breath sounds: Normal breath sounds  No wheezing or rales  Abdominal:      General: Bowel sounds are normal  There is no distension  Palpations: Abdomen is soft  Tenderness: There is no abdominal tenderness  Musculoskeletal:         General: Deformity (Status post left AKA, prosthesis present) present  No tenderness  Right lower leg: No edema  Left lower leg: No edema  Lymphadenopathy:      Cervical: No cervical adenopathy  Skin:     Findings: No rash  Neurological:      Mental Status: He is alert and oriented to person, place, and time     Psychiatric:         Behavior: Behavior normal          PAST MEDICAL HISTORY:  Past Medical History:   Diagnosis Date    ESRD (end stage renal disease) ([facility] [address] )     from vasculitis- now good gfr  off of HD    Hyperlipidemia     Hypertension     Hyperthyroidism        PAST SURGICAL HISTORY:  Past Surgical History:   Procedure Laterality Date    AMPUTATION ABOVE KNEE (AKA)      CHOLECYSTECTOMY      IR CHOLANGIOGRAM/PTC WITHOUT DRAIN  3/12/2014    TONSILLECTOMY      US GUIDANCE  1/30/2014       SOCIAL HISTORY:  Social History     Substance and Sexual Activity   Alcohol Use Not Currently     Social History     Substance and Sexual Activity   Drug Use Not Currently     Social History     Tobacco Use   Smoking Status Current Every Day Smoker    Packs/day: 0 50    Types: Cigarettes   Smokeless Tobacco Never Used       FAMILY HISTORY:  Family History   Problem Relation Age of Onset    Cirrhosis Father         liver        MEDICATIONS:    Current Outpatient Medications:     amLODIPine (NORVASC) 10 mg tablet, Take 1 tablet (10 mg total) by mouth daily, Disp: 30 tablet, Rfl: 5    aspirin 81 mg chewable tablet, Chew 81 mg daily, Disp: , Rfl:     atorvastatin (LIPITOR) 20 mg tablet, Take 20 mg by mouth every 24 hours, Disp: , Rfl:     azaTHIOprine 50 mg/mL in flavored oral syrup vehicle, Take 100 mg by mouth in the morning, Disp: , Rfl:     gabapentin (NEURONTIN) 100 mg capsule, Take 100 mg by mouth 2 (two) times a day, Disp: , Rfl:     MULTIPLE VITAMIN PO, every 24 hours, Disp: , Rfl:     olmesartan (BENICAR) 40 mg tablet, Take 1 tablet (40 mg total) by mouth daily, Disp: 30 tablet, Rfl: 5    tamsulosin (FLOMAX) 0 4 mg, Take 0 4 mg by mouth every 24 hours, Disp: , Rfl:     VITAMIN D PO, Take 1,000 Units by mouth every 24 hours, Disp: , Rfl:     Lab Results:   Results for orders placed or performed in visit on 07/21/22   Comprehensive metabolic panel   Result Value Ref Range    Glucose, Random 124 (H) 65 - 99 mg/dL    BUN 17 7 - 25 mg/dL    Creatinine 0 84 0 70 - 1 35 mg/dL    eGFR 99 > OR = 60 mL/min/1 73m2    SL AMB BUN/CREATININE RATIO NOT APPLICABLE 6 - 22 (calc)    Sodium 141 135 - 146 mmol/L    Potassium 4 6 3 5 - 5 3 mmol/L    Chloride 107 98 - 110 mmol/L    CO2 28 20 - 32 mmol/L    Calcium 9 3 8 6 - 10 3 mg/dL    Protein, Total 6 5 6 1 - 8 1 g/dL    Albumin 4 2 3 6 - 5 1 g/dL    Globulin 2 3 1 9 - 3 7 g/dL (calc)    Albumin/Globulin Ratio 1 8 1 0 - 2 5 (calc)    TOTAL BILIRUBIN 0 6 0 2 - 1 2 mg/dL    Alkaline Phosphatase 67 35 - 144 U/L    AST 13 10 - 35 U/L    ALT 12 9 - 46 U/L   Urinalysis with microscopic   Result Value Ref Range    Color UA YELLOW YELLOW    Urine Appearance CLEAR CLEAR    Specific Gravity 1 020 1 001 - 1 035    Ph 5 5 5 0 - 8 0    Glucose, Urine NEGATIVE NEGATIVE    Bilirubin, Urine NEGATIVE NEGATIVE    Ketone, Urine TRACE (A) NEGATIVE    Blood, Urine NEGATIVE NEGATIVE    Protein, Urine 1+ (A) NEGATIVE    SL AMB NITRITES URINE, QUAL   NEGATIVE NEGATIVE    Leukocyte Esterase NEGATIVE NEGATIVE    SL AMB WBC, URINE 0-5 < OR = 5 /HPF    RBC, Urine NONE SEEN < OR = 2 /HPF    Squamous Epithelial Cells NONE SEEN < OR = 5 /HPF    Bacteria, UA NONE SEEN NONE SEEN /HPF    Hyaline Casts NONE SEEN NONE SEEN /LPF

## 2022-09-05 DIAGNOSIS — R80.1 PERSISTENT PROTEINURIA: ICD-10-CM

## 2022-09-05 DIAGNOSIS — I10 PRIMARY HYPERTENSION: ICD-10-CM

## 2022-09-05 RX ORDER — OLMESARTAN MEDOXOMIL 40 MG/1
TABLET ORAL
Qty: 90 TABLET | Refills: 2 | Status: SHIPPED | OUTPATIENT
Start: 2022-09-05

## 2022-11-14 ENCOUNTER — TELEPHONE (OUTPATIENT)
Dept: NEPHROLOGY | Facility: CLINIC | Age: 62
End: 2022-11-14

## 2022-11-14 NOTE — TELEPHONE ENCOUNTER
Called and spoke with [employer] to complete their bloodwork prior to their appointment on 11/21/22 with Dr Leelee Hernandez at the [facility]

## 2022-11-21 ENCOUNTER — OFFICE VISIT (OUTPATIENT)
Dept: NEPHROLOGY | Facility: CLINIC | Age: 62
End: 2022-11-21

## 2022-11-21 VITALS
BODY MASS INDEX: 24.92 KG/M2 | HEIGHT: 72 IN | DIASTOLIC BLOOD PRESSURE: 84 MMHG | HEART RATE: 92 BPM | WEIGHT: 184 LBS | SYSTOLIC BLOOD PRESSURE: 140 MMHG

## 2022-11-21 DIAGNOSIS — M31.7 MICROSCOPIC POLYANGIITIS (HCC): ICD-10-CM

## 2022-11-21 DIAGNOSIS — R80.1 PERSISTENT PROTEINURIA: Primary | ICD-10-CM

## 2022-11-21 DIAGNOSIS — R31.29 MICROSCOPIC HEMATURIA: ICD-10-CM

## 2022-11-21 DIAGNOSIS — I10 PRIMARY HYPERTENSION: ICD-10-CM

## 2022-11-21 RX ORDER — AZATHIOPRINE 100 MG/1
100 TABLET ORAL
COMMUNITY

## 2022-11-21 NOTE — PATIENT INSTRUCTIONS
Low-Sodium Diet   WHAT YOU NEED TO KNOW:   A low-sodium diet limits foods that are high in sodium (salt)  You will need to follow a low-sodium diet if you have high blood pressure, kidney disease, or heart failure  You may also need to follow this diet if you have a condition that is causing your body to retain (hold) extra fluid  You may need to limit the amount of sodium you eat in a day to 1,500 to 2,000 mg  Ask your healthcare provider how much sodium you can have each day  DISCHARGE INSTRUCTIONS:   How to use food labels to choose foods that are low in sodium:  Read food labels to find the amount of sodium they contain  The amount of sodium is listed in milligrams (mg)  The % Daily Value (DV) column tells you how much of your daily needs are met by 1 serving of the food for each nutrient listed  Choose foods that have less than 5% of the DV of sodium  These foods are considered low in sodium  Foods that have 20% or more of the DV of sodium are considered high in sodium  Some food labels may also list any of the following terms that tell you about the sodium content in the food:  Sodium-free:  Less than 5 mg in each serving    Very low sodium:  35 mg of sodium or less in each serving    Low sodium:  140 mg of sodium or less in each serving    Reduced sodium: At least 25% less sodium in each serving than the regular type    Light in sodium:  50% less sodium in each serving    Unsalted or no added salt:  No extra salt is added during processing (the food may still contain sodium)       Foods to avoid:  Salty foods are high in sodium   You should avoid the following:  Processed foods:      Mixes for cornbread, biscuits, cake, and pudding     Instant foods, such as potatoes, cereals, noodles, and rice     Packaged foods, such as bread stuffing, rice and pasta mixes, snack dip mixes, and macaroni and cheese     Canned foods, such as canned vegetables, soups, broths, sauces, and vegetable or tomato juice    Snack foods, such as salted chips, popcorn, pretzels, pork rinds, salted crackers, and salted nuts    Frozen foods, such as dinners, entrees, vegetables with sauces, and breaded meats    Sauerkraut, pickled vegetables, and other foods prepared in brine    Meats and cheeses:      Smoked or cured meat, such as corned beef, dee, ham, hot dogs, and sausage    Canned meats or spreads, such as potted meats, sardines, anchovies, and imitation seafood    Deli or lunch meats, such as bologna, ham, turkey, and roast beef    Processed cheese, such as American cheese and cheese spreads    Condiments, sauces, and seasonings:      Salt (¼ teaspoon of salt contains 575 mg of sodium)    Seasonings made with salt, such as garlic salt, celery salt, onion salt, and seasoned salt    Regular soy sauce, barbecue sauce, teriyaki sauce, steak sauce, Worcestershire sauce, and most flavored vinegars    Canned gravy and mixes     Regular condiments, such as mustard, ketchup, and salad dressings    Pickles and olives    Meat tenderizers and monosodium glutamate (MSG)    Foods to include:  Read the food label to find the amount of sodium in each serving  Bread and cereal:  Try to choose breads with less than 80 mg of sodium per serving  Bread, roll, jennifer, tortilla, or unsalted crackers  Ready-to-eat cereals with less than 5% DV of sodium (examples include shredded wheat and puffed rice)    Pasta    Vegetables and fruits:      Unsalted fresh, frozen, or canned vegetables    Fresh, frozen, or canned fruits    Fruit juice    Dairy:  One serving has about 150 mg of sodium  Milk, all types    Yogurt    Hard cheese, such as cheddar, Swiss, [employer], or [employer] and other protein foods:  Some raw meats may have added sodium       Plain meats, fish, and poultry     Egg    Other foods:      Homemade pudding    Unsalted nuts, popcorn, or pretzels    Unsalted butter or margarine    Ways to decrease sodium:   Add spices and herbs to foods instead of salt during cooking  Use salt-free seasonings to add flavor to foods  Examples include onion powder, garlic powder, basil, gayle powder, paprika, and parsley  Try lemon or lime juice or vinegar to give foods a tart flavor  Use hot peppers, pepper, or cayenne pepper to add a spicy flavor  Do not keep a salt shaker at your kitchen table  This may help keep you from adding salt to food at the table  A teaspoon of salt has 2,300 mg of sodium  It may take time to get used to enjoying the natural flavor of food instead of adding salt  Talk to your healthcare provider before you use salt substitutes  Some salt substitutes have a high amount of potassium and need to be avoided if you have kidney disease  Choose low-sodium foods at restaurants  Meals from restaurants are often high in sodium  Some restaurants have nutrition information on the menu that tells you the amount of sodium in their foods  If possible, ask for your food to be prepared with less, or no salt  Shop for unsalted or low-sodium foods and snacks at the grocery store  Examples include unsalted or low-sodium broths, soups, and canned vegetables  Choose fresh or frozen vegetables instead  Choose unsalted nuts or seeds or fresh fruits or vegetables as snacks  Read food labels and choose salt-free, very low-sodium, or low-sodium foods  © Copyright [employer] 2022 Information is for End User's use only and may not be sold, redistributed or otherwise used for commercial purposes  All illustrations and images included in CareNotes® are the copyrighted property of [employer]  or [facility] Babak Berry   The above information is an  only  It is not intended as medical advice for individual conditions or treatments  Talk to your doctor, nurse or pharmacist before following any medical regimen to see if it is safe and effective for you  Potassium Content of Foods List   WHAT YOU NEED TO KNOW:   What is potassium? Potassium is a mineral that is found in most foods  Potassium helps to balance fluids and minerals in your body  It also helps your body maintain a normal blood pressure  Potassium helps your muscles contract and your nerves function normally  Why do I need to change the amount of potassium I eat? You may need more potassium  if you have hypokalemia (low potassium levels) or high blood pressure  You may also need more potassium if you are taking diuretics  Diuretics and certain medicines cause your body to lose potassium  You may need less potassium  in your diet if you have hyperkalemia (high potassium levels) or kidney disease  How much potassium does fruit contain? The amount of potassium in milligrams (mg) contained in each fruit or serving of fruit is listed beside the item  High-potassium foods (more than 200 mg per serving):      1 medium banana (425)    ½ of a papaya (390)    ½ cup of prune juice (370)    ¼ cup of raisins (270)    1 medium lobo (325) or kiwi (240)    1 small orange (240) or ½ cup of orange juice (235)    ½ cup of cubed cantaloupe (215) or diced honeydew melon (200)    1 medium pear (200)    Medium-potassium foods (50 to 200 mg per serving):      1 medium peach (185)    1 small apple or ½ cup of apple juice (150)    ½ cup of peaches canned in juice (120)    ½ cup of canned pineapple (100)    ½ cup of fresh, sliced strawberries (654)    ½ cup of watermelon (85)    Low-potassium foods (less than 50 mg per serving):      ½ cup of cranberries (45) or cranberry juice cocktail (20)    ½ cup of nectar of papaya, lobo, or pear (35)    How much potassium do vegetables contain?    High-potassium foods (more than 200 mg per serving):      1 medium baked potato, with skin (925)    1 baked medium sweet potato, with skin (450)    ½ cup of tomato or vegetable juice (275), or 1 medium raw tomato (290)    ½ cup of mushrooms (280)    ½ cup of fresh brussels sprouts (250)    ½ cup of cooked zucchini (220) or winter squash (250)    ¼ of a medium avocado (245)    ½ cup of broccoli (230)    Medium-potassium foods (50 to 200 mg per serving):      ½ cup of corn (195)    ½ cup of fresh or cooked carrots (180)    ½ cup of fresh cauliflower (150)    ½ cup of asparagus (155)    ½ cup of canned peas (90)     1 cup of lettuce, all types (100)    ½ cup of fresh green beans (90)    ½ cup of frozen green beans (85)    ½ cup of cucumber (80)    How much potassium do protein foods contain? High-potassium foods (more than 200 mg per serving):      ½ cup of cooked de jesus beans (400) or lentils (365)    1 cup of soy milk (300)    3 ounces of baked or broiled salmon (319)    3 ounces of roasted turkey, dark meat (250)    ¼ cup of sunflower seeds (241)    3 ounces of cooked lean beef (224)    2 tablespoons of smooth peanut butter (210)    Medium-potassium foods (50 to 200 mg per serving):      1 ounce of salted peanuts, almonds, or cashews (200)    1 large egg (60)    How much potassium do dairy foods contain? High-potassium foods (more than 200 mg per serving):      6 ounces of yogurt (260 to 435)    1 cup of nonfat, low-fat, or whole milk (350 to 380)    Medium-potassium foods (50 to 200 mg per serving):      ½ cup of ricotta cheese (154)    ½ cup of vanilla ice cream (131)    ½ cup of low-fat (2%) cottage cheese (110)    Low-potassium foods (less than 50 mg per serving):      1 ounce of cheese (20 to 30)      How much potassium do grains contain? 1 slice of white bread (30)    ½ cup of white or brown rice (50)    ½ cup of spaghetti or macaroni (30)    1 flour or corn tortilla (50)    1 four-inch waffle (50)    What other foods contain potassium? 1 tablespoon of molasses (295)    1½ ounces of chocolate (165)    Some salt substitutes may contain a high amount of potassium  Check the food label to find the amount of potassium it contains  CARE AGREEMENT:   You have the right to help plan your care   Discuss treatment options with your healthcare provider to decide what care you want to receive  You always have the right to refuse treatment  The above information is an  only  It is not intended as medical advice for individual conditions or treatments  Talk to your doctor, nurse or pharmacist before following any medical regimen to see if it is safe and effective for you  © Copyright [employer] 2022 Information is for End User's use only and may not be sold, redistributed or otherwise used for commercial purposes   All illustrations and images included in CareNotes® are the copyrighted property of A D A M , Inc  or [address] [place] [address]

## 2022-11-21 NOTE — PROGRESS NOTES
NEPHROLOGY OUTPATIENT PROGRESS NOTE   Maria Teresa Henao 65 y o  male MRN: [id number]  DATE: 11/21/2022  Reason for visit:   Chief Complaint   Patient presents with   • Follow-up   • Proteinuria   • Hypertension     ASSESSMENT and PLAN:  History of microscopic polyangiitis   -patient has history of SHONNA/RPGN secondary to microscopic polyangiitis in 2014-15, status post renal biopsy that time and also briefly required dialysis  -as per chart review, patient also had plasma exchange, steroids and cyclophosphamide that time   -since then overall had significant renal recovery with most recent creatinine 0 7 in November 2022   -currently remains on azathioprine which was restarted in May 2022 by Rheumatology  -UA in November 2022 shows no significant hematuria, 3+ proteinuria    -most recent PR3 antibody 40 in July 2022 <-- 46 in April 2022 (prior 98 in 2016), no recent ANCA panel available, check ANCA now and again before next visit  -as per my prior discussion with Rheumatology, due to very stable renal function, no hematuria on recent UA, improving proteinuria, we will continue to monitor conservatively  Recent UPC ratio not available  Check now along with ANCA panel   -closely monitor Anca panel, UA with microscopy, BMP before next visit  -we will have low threshold for renal biopsy if has any worsening hematuria/proteinuria or worsening renal failure   -recent serological workup shows normal complements, PR3 antibody 40 3, KAYLEE negative, SPEP negative in July 2022     Microscopic hematuria/proteinuria  -most recent UA shows no significant hematuria  -further workup as above  -monitor with repeat urine studies before next visit      Proteinuria  -urine microalbumin/creatinine ratio 141 mg in July 2022  -upc ratio 1 5 g in April 2022 improving to 0 3 g in July 2022  -he was recommended to increase Olmesartan during last visit although he had not done this    He continues to take 20 mg daily and has finally increased to 40 mg daily since yesterday   -fortunately serum albumin remained stable 4 3     Hypertension  -blood pressure  slightly improved on repeat check   -he did not increase olmesartan as recommended during last visit  Finally has increased to 40 mg daily yesterday    -continue current amlodipine 10 mg daily  -Strongly recommended to check BP and call back if BP remains persistently greater than 140/90  He has not brought BP machine to the office today  -salt restricted diet recommended  -still smokes half pack per day       Microscopic polyangiitis, follows with Rheumatology Dr Mariaelena Benitez  -now restarted on azathioprine in May 22    Diagnoses and all orders for this visit:    Persistent proteinuria  -     Anti-neutrophilic cytoplasmic antibody  -     Protein / creatinine ratio, urine  -     UA (URINE) with reflex to Scope; Future  -     Protein / creatinine ratio, urine; Future    Microscopic hematuria  -     Anti-neutrophilic cytoplasmic antibody  -     Protein / creatinine ratio, urine  -     UA (URINE) with reflex to Scope; Future  -     Protein / creatinine ratio, urine; Future    Primary hypertension  -     Comprehensive metabolic panel; Future  -     UA (URINE) with reflex to Scope; Future  -     Protein / creatinine ratio, urine; Future  -     CBC; Future  -     Phosphorus; Future  -     PTH, intact; Future  -     Anti-neutrophilic cytoplasmic antibody; Future    Microscopic polyangiitis (HCC)  -     Anti-neutrophilic cytoplasmic antibody  -     Protein / creatinine ratio, urine  -     Comprehensive metabolic panel; Future  -     UA (URINE) with reflex to Scope; Future  -     Protein / creatinine ratio, urine; Future  -     CBC; Future  -     Phosphorus; Future  -     PTH, intact; Future  -     Anti-neutrophilic cytoplasmic antibody; Future    Other orders  -     azaTHIOprine (IMURAN) 100 MG tablet;  Take 100 mg by mouth          SUBJECTIVE / HPI:  Anil PARNELL 63 y o  year old male with medical issues of hypertension diagnosed recently, hyperlipidemia, history of microscopic polyangiitis, mononeuritis multiplex, episode of SHONNA/RPGN requiring dialysis in 2014-15, steroid induced hyperglycemia, not on any antidiabetic medications, history of kidney stones requiring lithotripsy in 2010, left AKA in 2016 who presents for regular follow-up of proteinuria/hematuria    Patient apparently had kidney biopsy when he had episode of SHONNA requiring dialysis in 2014 15   Also required steroids, cyclophosphamide, plasma exchange that time   Since then had good renal recovery with most recent creatinine overall stable as mentioned above    he lost insurance for about 1 5 years and was not taking any medications up until earlier this year when he restarted taking all medications after receiving his insurance back  Since last visit he was recommended to increase olmesartan although he still did not do this until yesterday when he increased it to 40 mg daily  Currently remains on azathioprine  Smokes half pack per day  Currently denies any urinary complaint  Denies any new skin rash    REVIEW OF SYSTEMS:  More than 10 point review of systems were obtained and discussed in length with the patient  Complete review of systems were negative / unremarkable except mentioned above  PHYSICAL EXAM:  Vitals:    11/21/22 1122 11/21/22 1205   BP: 146/94 140/84   BP Location: Left arm    Patient Position: Sitting    Cuff Size: Standard    Pulse: 92    Weight: 83 5 kg (184 lb)    Height: 6' (1 829 m)      Body mass index is 24 95 kg/m²  Physical Exam  Vitals reviewed  Constitutional:       Appearance: He is well-developed and well-nourished  HENT:      Head: Normocephalic and atraumatic  Right Ear: External ear normal       Left Ear: External ear normal    Eyes:      General: No scleral icterus       Extraocular Movements: EOM normal       Conjunctiva/sclera: Conjunctivae normal    Cardiovascular:      Comments: S1, S2 present  Pulmonary:      Effort: Pulmonary effort is normal  No respiratory distress  Breath sounds: Normal breath sounds  No wheezing or rales  Abdominal:      General: Bowel sounds are normal  There is no distension  Palpations: Abdomen is soft  Tenderness: There is no abdominal tenderness  Musculoskeletal:         General: No deformity (History of left AKA, no new deformity) or edema  Lymphadenopathy:      Cervical: No cervical adenopathy  Skin:     Findings: No rash  Neurological:      Mental Status: He is alert and oriented to person, place, and time     Psychiatric:         Mood and Affect: Mood and affect normal          Behavior: Behavior normal          PAST MEDICAL HISTORY:  Past Medical History:   Diagnosis Date   • ESRD (end stage renal disease) ([facility] [address] )     from vasculitis- now good gfr  off of HD   • Hyperlipidemia    • Hypertension    • Hyperthyroidism        PAST SURGICAL HISTORY:  Past Surgical History:   Procedure Laterality Date   • AMPUTATION ABOVE KNEE (AKA)     • CHOLECYSTECTOMY     • IR CHOLANGIOGRAM/PTC WITHOUT DRAIN  3/12/2014   • TONSILLECTOMY     • US GUIDANCE  1/30/2014       SOCIAL HISTORY:  Social History     Substance and Sexual Activity   Alcohol Use Not Currently     Social History     Substance and Sexual Activity   Drug Use Not Currently     Social History     Tobacco Use   Smoking Status Every Day   • Packs/day: 0 50   • Types: Cigarettes   Smokeless Tobacco Never       FAMILY HISTORY:  Family History   Problem Relation Age of Onset   • Cirrhosis Father         liver        MEDICATIONS:    Current Outpatient Medications:   •  amLODIPine (NORVASC) 10 mg tablet, Take 1 tablet (10 mg total) by mouth daily, Disp: 30 tablet, Rfl: 5  •  aspirin 81 mg chewable tablet, Chew 81 mg daily, Disp: , Rfl:   •  atorvastatin (LIPITOR) 20 mg tablet, Take 40 mg by mouth every 24 hours, Disp: , Rfl:   •  azaTHIOprine (IMURAN) 100 MG tablet, Take 100 mg by mouth, Disp: , Rfl:   • gabapentin (NEURONTIN) 100 mg capsule, Take 100 mg by mouth 2 (two) times a day, Disp: , Rfl:   •  MULTIPLE VITAMIN PO, every 24 hours, Disp: , Rfl:   •  olmesartan (BENICAR) 40 mg tablet, TAKE 1 TABLET BY MOUTH EVERY DAY, Disp: 90 tablet, Rfl: 2  •  tamsulosin (FLOMAX) 0 4 mg, Take 0 4 mg by mouth every 24 hours, Disp: , Rfl:   •  VITAMIN D PO, Take 1,000 Units by mouth every 24 hours, Disp: , Rfl:   •  azaTHIOprine 50 mg/mL in flavored oral syrup vehicle, Take 100 mg by mouth in the morning (Patient not taking: Reported on 11/21/2022), Disp: , Rfl:     Lab Results:   Results for orders placed or performed in visit on 07/21/22   Comprehensive metabolic panel   Result Value Ref Range    Glucose, Random 124 (H) 65 - 99 mg/dL    BUN 17 7 - 25 mg/dL    Creatinine 0 84 0 70 - 1 35 mg/dL    eGFR 99 > OR = 60 mL/min/1 73m2    SL AMB BUN/CREATININE RATIO NOT APPLICABLE 6 - 22 (calc)    Sodium 141 135 - 146 mmol/L    Potassium 4 6 3 5 - 5 3 mmol/L    Chloride 107 98 - 110 mmol/L    CO2 28 20 - 32 mmol/L    Calcium 9 3 8 6 - 10 3 mg/dL    Protein, Total 6 5 6 1 - 8 1 g/dL    Albumin 4 2 3 6 - 5 1 g/dL    Globulin 2 3 1 9 - 3 7 g/dL (calc)    Albumin/Globulin Ratio 1 8 1 0 - 2 5 (calc)    TOTAL BILIRUBIN 0 6 0 2 - 1 2 mg/dL    Alkaline Phosphatase 67 35 - 144 U/L    AST 13 10 - 35 U/L    ALT 12 9 - 46 U/L   Urinalysis with microscopic   Result Value Ref Range    Color UA YELLOW YELLOW    Urine Appearance CLEAR CLEAR    Specific Gravity 1 020 1 001 - 1 035    Ph 5 5 5 0 - 8 0    Glucose, Urine NEGATIVE NEGATIVE    Bilirubin, Urine NEGATIVE NEGATIVE    Ketone, Urine TRACE (A) NEGATIVE    Blood, Urine NEGATIVE NEGATIVE    Protein, Urine 1+ (A) NEGATIVE    SL AMB NITRITES URINE, QUAL   NEGATIVE NEGATIVE    Leukocyte Esterase NEGATIVE NEGATIVE    SL AMB WBC, URINE 0-5 < OR = 5 /HPF    RBC, Urine NONE SEEN < OR = 2 /HPF    Squamous Epithelial Cells NONE SEEN < OR = 5 /HPF    Bacteria, UA NONE SEEN NONE SEEN /HPF    Hyaline Casts NONE SEEN NONE SEEN /LPF

## 2023-03-13 ENCOUNTER — TELEPHONE (OUTPATIENT)
Dept: NEPHROLOGY | Facility: CLINIC | Age: 63
End: 2023-03-13

## 2023-03-15 ENCOUNTER — TELEPHONE (OUTPATIENT)
Dept: NEPHROLOGY | Facility: CLINIC | Age: 63
End: 2023-03-15

## 2023-03-15 NOTE — TELEPHONE ENCOUNTER
Called patient to remained to please have blood and urine work done before the follow up appointment  Patient requested to fax labs at [facility] to ALEJANDRINA  Faxed at [phone]

## 2023-03-16 NOTE — TELEPHONE ENCOUNTER
Patient called stating he went for labs today and they were not at [address] we refax and he will go tomorrow  Faxed to [phone]   Confirmation received @ 11:42

## 2023-03-17 ENCOUNTER — TELEPHONE (OUTPATIENT)
Dept: NEPHROLOGY | Facility: CLINIC | Age: 63
End: 2023-03-17

## 2023-03-17 NOTE — TELEPHONE ENCOUNTER
Called patient and left a voicemail asking to please have labs done before the follow up appointment

## 2023-03-18 LAB
ALBUMIN SERPL-MCNC: 4.3 G/DL (ref 3.6–5.1)
ALBUMIN/GLOB SERPL: 1.7 (CALC) (ref 1–2.5)
ALP SERPL-CCNC: 57 U/L (ref 35–144)
ALT SERPL-CCNC: 10 U/L (ref 9–46)
APPEARANCE UR: CLEAR
AST SERPL-CCNC: 14 U/L (ref 10–35)
BACTERIA UR QL AUTO: ABNORMAL /HPF
BASOPHILS # BLD AUTO: 100 CELLS/UL (ref 0–200)
BASOPHILS NFR BLD AUTO: 1.7 %
BILIRUB SERPL-MCNC: 0.5 MG/DL (ref 0.2–1.2)
BILIRUB UR QL STRIP: NEGATIVE
BUN SERPL-MCNC: 18 MG/DL (ref 7–25)
BUN/CREAT SERPL: ABNORMAL (CALC) (ref 6–22)
CALCIUM SERPL-MCNC: 9.4 MG/DL (ref 8.6–10.3)
CALCIUM SERPL-MCNC: 9.4 MG/DL (ref 8.6–10.3)
CHLORIDE SERPL-SCNC: 107 MMOL/L (ref 98–110)
CO2 SERPL-SCNC: 28 MMOL/L (ref 20–32)
COLOR UR: YELLOW
CREAT SERPL-MCNC: 0.77 MG/DL (ref 0.7–1.35)
CREAT UR-MCNC: 55 MG/DL (ref 20–320)
EOSINOPHIL # BLD AUTO: 242 CELLS/UL (ref 15–500)
EOSINOPHIL NFR BLD AUTO: 4.1 %
ERYTHROCYTE [DISTWIDTH] IN BLOOD BY AUTOMATED COUNT: 12.6 % (ref 11–15)
GFR/BSA.PRED SERPLBLD CYS-BASED-ARV: 102 ML/MIN/1.73M2
GLOBULIN SER CALC-MCNC: 2.5 G/DL (CALC) (ref 1.9–3.7)
GLUCOSE SERPL-MCNC: 135 MG/DL (ref 65–99)
GLUCOSE UR QL STRIP: NEGATIVE
HCT VFR BLD AUTO: 48.8 % (ref 38.5–50)
HGB BLD-MCNC: 17.1 G/DL (ref 13.2–17.1)
HGB UR QL STRIP: NEGATIVE
HYALINE CASTS #/AREA URNS LPF: ABNORMAL /LPF
KETONES UR QL STRIP: NEGATIVE
LEUKOCYTE ESTERASE UR QL STRIP: NEGATIVE
LYMPHOCYTES # BLD AUTO: 779 CELLS/UL (ref 850–3900)
LYMPHOCYTES NFR BLD AUTO: 13.2 %
MCH RBC QN AUTO: 31.3 PG (ref 27–33)
MCHC RBC AUTO-ENTMCNC: 35 G/DL (ref 32–36)
MCV RBC AUTO: 89.2 FL (ref 80–100)
MONOCYTES # BLD AUTO: 472 CELLS/UL (ref 200–950)
MONOCYTES NFR BLD AUTO: 8 %
MYELOPEROXIDASE AB SER IA-ACNC: <1 AI
NEUTROPHILS # BLD AUTO: 4307 CELLS/UL (ref 1500–7800)
NEUTROPHILS NFR BLD AUTO: 73 %
NITRITE UR QL STRIP: NEGATIVE
PH UR STRIP: 6.5 [PH] (ref 5–8)
PHOSPHATE SERPL-MCNC: 3.4 MG/DL (ref 2.5–4.5)
PLATELET # BLD AUTO: 202 THOUSAND/UL (ref 140–400)
PMV BLD REES-ECKER: 10.8 FL (ref 7.5–12.5)
POTASSIUM SERPL-SCNC: 4.6 MMOL/L (ref 3.5–5.3)
PROT SERPL-MCNC: 6.8 G/DL (ref 6.1–8.1)
PROT UR QL STRIP: ABNORMAL
PROT UR-MCNC: 17 MG/DL (ref 5–25)
PROT/CREAT UR: 0.31 MG/MG CREAT (ref 0.03–0.15)
PROT/CREAT UR: 309 MG/G CREAT (ref 25–148)
PROTEINASE3 AB SER IA-ACNC: 30 AI
PTH-INTACT SERPL-MCNC: 16 PG/ML (ref 16–77)
RBC # BLD AUTO: 5.47 MILLION/UL (ref 4.2–5.8)
RBC #/AREA URNS HPF: ABNORMAL /HPF
SODIUM SERPL-SCNC: 141 MMOL/L (ref 135–146)
SP GR UR STRIP: 1.01 (ref 1–1.03)
SQUAMOUS #/AREA URNS HPF: ABNORMAL /HPF
WBC # BLD AUTO: 5.9 THOUSAND/UL (ref 3.8–10.8)
WBC #/AREA URNS HPF: ABNORMAL /HPF

## 2023-03-21 ENCOUNTER — TELEPHONE (OUTPATIENT)
Dept: NEPHROLOGY | Facility: CLINIC | Age: 63
End: 2023-03-21

## 2023-03-21 NOTE — TELEPHONE ENCOUNTER
LM for pt to r/s 3/23 appt with Dr Shruti Santiago due to a change in her schedule   Patient is transferring care from Dr Lady Bennett and can see any provider

## 2023-03-22 NOTE — TELEPHONE ENCOUNTER
LM for pt informing him we are canceling appt with Dr Lawrence Looney and to call back to r/s - letter sent

## 2023-03-24 ENCOUNTER — TELEPHONE (OUTPATIENT)
Dept: NEPHROLOGY | Facility: CLINIC | Age: 63
End: 2023-03-24

## 2023-03-24 NOTE — TELEPHONE ENCOUNTER
Please let him know creatinine remains stable at baseline  No hematuria on urine analysis  Proteinuria continue to improve and NM-3 antibody level also slowly improving  We will continue to monitor as discussed during prior office visit    He has upcoming office appointment with Dr Ash Dave in 4/2023

## 2023-03-27 NOTE — TELEPHONE ENCOUNTER
Left message with patient advisng:Please let him know creatinine remains stable at baseline  No hematuria on urine analysis  Proteinuria continue to improve and MO-3 antibody level also slowly improving  We will continue to monitor as discussed during prior office visit    He has upcoming office appointment with Dr Sanjana Handy in 4/2023

## 2023-04-27 ENCOUNTER — OFFICE VISIT (OUTPATIENT)
Dept: NEPHROLOGY | Facility: CLINIC | Age: 63
End: 2023-04-27

## 2023-04-27 VITALS
SYSTOLIC BLOOD PRESSURE: 142 MMHG | HEART RATE: 82 BPM | BODY MASS INDEX: 24.65 KG/M2 | DIASTOLIC BLOOD PRESSURE: 78 MMHG | HEIGHT: 72 IN | WEIGHT: 182 LBS

## 2023-04-27 DIAGNOSIS — I12.9 BENIGN HYPERTENSION WITH CKD (CHRONIC KIDNEY DISEASE) STAGE I: ICD-10-CM

## 2023-04-27 DIAGNOSIS — N28.1 RENAL CYST: ICD-10-CM

## 2023-04-27 DIAGNOSIS — M31.7 MICROSCOPIC POLYANGIITIS (HCC): Primary | ICD-10-CM

## 2023-04-27 DIAGNOSIS — N18.1 BENIGN HYPERTENSION WITH CKD (CHRONIC KIDNEY DISEASE) STAGE I: ICD-10-CM

## 2023-04-27 DIAGNOSIS — N18.1 STAGE 1 CHRONIC KIDNEY DISEASE: ICD-10-CM

## 2023-04-27 DIAGNOSIS — R80.9 PROTEINURIA, UNSPECIFIED TYPE: ICD-10-CM

## 2023-04-27 RX ORDER — CHLORTHALIDONE 25 MG/1
12.5 TABLET ORAL 3 TIMES WEEKLY
Qty: 18 TABLET | Refills: 3 | Status: SHIPPED | OUTPATIENT
Start: 2023-04-27 | End: 2023-07-26

## 2023-04-27 NOTE — PATIENT INSTRUCTIONS
Your blood pressure is slightly above goal   Start chlorthalidone half tablet on Monday/Wednesday/Friday  After starting the medication please check your blood work in 1 month  Please check your blood work and urine test before you return for next office visit

## 2023-04-27 NOTE — PROGRESS NOTES
NEPHROLOGY OFFICE VISIT   Aminta Altamirano 59 y o  male MRN: [id number]  4/27/2023    Reason for Visit: History of microscopic polyangiitis    ASSESSMENT and PLAN:  It was a pleasure evaluating your patient in the office today  Thank you for allowing our team to participate in the care of Mr Aminta Altamirano  Please do not hesitate to contact our team if further issues/questions shall arise in the interim       # Chronic kidney disease stage I  # History of microscopic polyangiitis  - Patient has history of SHONNA/RPGN secondary to microscopic polyangiitis in 2014/2015 status post kidney biopsy at that time and briefly required dialysis  - Patient also had plasma exchange, steroids and cyclophosphamide at that time  - Since then he had significant renal recovery with most recent creatinine 0 7 in 03/2023  - Urinalysis: No hematuria, trace proteinuria 03/2023  - Proteinuria: Urine protein to creatinine ratio 309 mg 03/2023 improved from 344 mg 07/2022  - ME-3 antibody 30 03/2023 improved from 40 07/2022  - He is currently maintained on azathioprine per rheumatology  - Currently there is no indication of vasculitis activity based on stability of renal function, absence of hematuria, improvement of proteinuria and improvement of ME-3 antibody  - No indication for kidney biopsy at this time  - Continue to monitor urinalysis, urine protein excretion, ME-3 antibody level every 3 months    # Proteinuria  - Urine protein to creatinine ratio 309 mg 03/2023 improved from 344 mg 07/2022  - Urine protein to creatinine ratio was 1 5 g in 04/2022 which has now significantly improved  - It appears that proteinuria has improved with RAAS blockade and would continue RAAS blockade at this time  - We will also try to achieve better blood pressure control to retard albuminuria further  - Management of hypertension as below    # Hypertension/Volume   - Target Goal: Less than 120/80 per KDIGO guidelines   - Volume status: Euvolemic  - Status: Blood pressure currently above goal  - Current antihypertensive regimen: Olmesartan 40 mg daily, amlodipine 10 mg daily, Flomax 0 4 mg daily  - Changes: Add chlorthalidone 12 5 mg on MWF schedule  - Check BMP/magnesium in 1 month    # Renal cyst  - 2 4 cm simple cyst in the upper pole of the left kidney  - No further work-up needed    # Follow-up  - Follow-up labs in 1 month and then in 3 to 4 months  - Office visit in 3 to 4 months    HPI:  Since last visit: Patient has been doing well  He is checking his blood pressure at home and usually his blood pressure runs from 130s to 140s  He denies any leg swelling  He denies dyspnea  He denies any trouble with urination  62-year-old male with history of hypertension, hyperlipidemia, microscopic polyangiitis, mononeuritis multiplex, episode of acute kidney injury/RPGN requiring dialysis, steroid-induced hyperglycemia, left above-knee amputation in 2016 presents for regular follow-up  He had kidney biopsy when he had episode of acute kidney injury requiring dialysis in 2014/2015  He also required steroids, cyclophosphamide and plasma exchange at that time  Since then he has had good renal recovery with most recent serum creatinine within normal limits  He is currently maintained on azathioprine per rheumatology  He is currently following up with rheumatology  PATIENT INSTRUCTIONS:    Patient Instructions   Your blood pressure is slightly above goal   Start chlorthalidone half tablet on Monday/Wednesday/Friday  After starting the medication please check your blood work in 1 month  Please check your blood work and urine test before you return for next office visit  OBJECTIVE:  Current Weight: Weight - Scale: 82 6 kg (182 lb)  Vitals:    04/27/23 1330   BP: 142/78   BP Location: Right arm   Patient Position: Sitting   Cuff Size: Standard   Pulse: 82   Weight: 82 6 kg (182 lb)   Height: 6' (1 829 m)    Body mass index is 24 68 kg/m²        REVIEW OF SYSTEMS:    Review of Systems   Constitutional: Negative for chills and fever  HENT: Negative for ear pain and sore throat  Eyes: Negative for pain and visual disturbance  Respiratory: Negative for cough and shortness of breath  Cardiovascular: Negative for chest pain and palpitations  Gastrointestinal: Negative for abdominal pain and vomiting  Genitourinary: Negative for dysuria and hematuria  Musculoskeletal: Negative for arthralgias and back pain  Skin: Negative for color change and rash  Neurological: Negative for seizures and syncope  All other systems reviewed and are negative  Past Medical History:   Diagnosis Date   • ESRD (end stage renal disease) ([facility] [address] )     from vasculitis- now good gfr  off of HD   • Hyperlipidemia    • Hypertension    • Hyperthyroidism        Past Surgical History:   Procedure Laterality Date   • AMPUTATION ABOVE KNEE (AKA)     • CHOLECYSTECTOMY     • IR CHOLANGIOGRAM/PTC WITHOUT DRAIN  3/12/2014   • TONSILLECTOMY     • US GUIDANCE  1/30/2014       Family History   Problem Relation Age of Onset   • Cirrhosis Father         liver         Social History     Substance and Sexual Activity   Alcohol Use Not Currently     Social History     Substance and Sexual Activity   Drug Use Not Currently     Social History     Tobacco Use   Smoking Status Every Day   • Packs/day: 0 50   • Types: Cigarettes   Smokeless Tobacco Never       PHYSICAL EXAM:      Physical Exam  Constitutional:       Appearance: Normal appearance  HENT:      Head: Normocephalic and atraumatic  Mouth/Throat:      Mouth: Mucous membranes are moist       Pharynx: Oropharynx is clear  Cardiovascular:      Rate and Rhythm: Normal rate and regular rhythm  Pulses: Normal pulses  Heart sounds: Normal heart sounds  Pulmonary:      Effort: Pulmonary effort is normal       Breath sounds: Normal breath sounds     Abdominal:      General: Bowel sounds are normal       Palpations: Abdomen is soft    Musculoskeletal:         General: Normal range of motion  Right lower leg: No edema  Comments: Left above-knee amputation   Skin:     General: Skin is warm and dry  Neurological:      General: No focal deficit present  Mental Status: He is alert and oriented to person, place, and time  Mental status is at baseline  Psychiatric:         Mood and Affect: Mood normal          Behavior: Behavior normal          Thought Content:  Thought content normal          Judgment: Judgment normal          Medications:    Current Outpatient Medications:   •  amLODIPine (NORVASC) 10 mg tablet, Take 1 tablet (10 mg total) by mouth daily, Disp: 30 tablet, Rfl: 5  •  aspirin 81 mg chewable tablet, Chew 81 mg daily, Disp: , Rfl:   •  atorvastatin (LIPITOR) 20 mg tablet, Take 40 mg by mouth every 24 hours, Disp: , Rfl:   •  azaTHIOprine (IMURAN) 100 MG tablet, Take 100 mg by mouth, Disp: , Rfl:   •  chlorthalidone 25 mg tablet, Take 0 5 tablets (12 5 mg total) by mouth 3 (three) times a week, Disp: 18 tablet, Rfl: 3  •  gabapentin (NEURONTIN) 100 mg capsule, Take 300 mg by mouth in the morning, Disp: , Rfl:   •  MULTIPLE VITAMIN PO, every 24 hours, Disp: , Rfl:   •  olmesartan (BENICAR) 40 mg tablet, TAKE 1 TABLET BY MOUTH EVERY DAY, Disp: 90 tablet, Rfl: 2  •  tamsulosin (FLOMAX) 0 4 mg, Take 0 4 mg by mouth every 24 hours, Disp: , Rfl:   •  VITAMIN D PO, Take 1,000 Units by mouth every 24 hours, Disp: , Rfl:   •  azaTHIOprine 50 mg/mL in flavored oral syrup vehicle, Take 100 mg by mouth in the morning (Patient not taking: Reported on 11/21/2022), Disp: , Rfl:     Laboratory Results:        Invalid input(s): ALBUMIN    Results for orders placed or performed in visit on 03/17/23   PTH, Intact and Calcium   Result Value Ref Range    PTH, Intact 16 16 - 77 pg/mL    Calcium 9 4 8 6 - 10 3 mg/dL   Phosphorus   Result Value Ref Range    Phosphorus, Serum 3 4 2 5 - 4 5 mg/dL   Comprehensive metabolic panel   Result Value Ref Range    Glucose, Random 135 (H) 65 - 99 mg/dL    BUN 18 7 - 25 mg/dL    Creatinine 0 77 0 70 - 1 35 mg/dL    eGFR 102 > OR = 60 mL/min/1 73m2    SL AMB BUN/CREATININE RATIO NOT APPLICABLE 6 - 22 (calc)    Sodium 141 135 - 146 mmol/L    Potassium 4 6 3 5 - 5 3 mmol/L    Chloride 107 98 - 110 mmol/L    CO2 28 20 - 32 mmol/L    Calcium 9 4 8 6 - 10 3 mg/dL    Protein, Total 6 8 6 1 - 8 1 g/dL    Albumin 4 3 3 6 - 5 1 g/dL    Globulin 2 5 1 9 - 3 7 g/dL (calc)    Albumin/Globulin Ratio 1 7 1 0 - 2 5 (calc)    TOTAL BILIRUBIN 0 5 0 2 - 1 2 mg/dL    Alkaline Phosphatase 57 35 - 144 U/L    AST 14 10 - 35 U/L    ALT 10 9 - 46 U/L   CBC and differential   Result Value Ref Range    White Blood Cell Count 5 9 3 8 - 10 8 Thousand/uL    Red Blood Cell Count 5 47 4 20 - 5 80 Million/uL    Hemoglobin 17 1 13 2 - 17 1 g/dL    HCT 48 8 38 5 - 50 0 %    MCV 89 2 80 0 - 100 0 fL    MCH 31 3 27 0 - 33 0 pg    MCHC 35 0 32 0 - 36 0 g/dL    RDW 12 6 11 0 - 15 0 %    Platelet Count 395 961 - 400 Thousand/uL    SL AMB MPV 10 8 7 5 - 12 5 fL    Neutrophils (Absolute) 4,307 1,500 - 7,800 cells/uL    Lymphocytes (Absolute) 779 (L) 850 - 3,900 cells/uL    Monocytes (Absolute) 472 200 - 950 cells/uL    Eosinophils (Absolute) 242 15 - 500 cells/uL    Basophils  0 - 200 cells/uL    Neutrophils 73 %    Lymphocytes 13 2 %    Monocytes 8 0 %    Eosinophils 4 1 %    Basophils PCT 1 7 %   Anti-neutrophilic cytoplasmic antibody   Result Value Ref Range    MPO AB <1 0 AI    RI-3 AB 30 0 (H) AI   Protein, Total w/Creat, Random Urine   Result Value Ref Range    Creatinine, Urine 55 20 - 320 mg/dL    Protein/Creat Ratio 309 (H) 25 - 148 mg/g creat    Protein/Creat Ratio 0 309 (H) 0 025 - 0 148 mg/mg creat    Total Protein, Urine 17 5 - 25 mg/dL   UA (URINE) with reflex to Scope   Result Value Ref Range    Color UA YELLOW YELLOW    Urine Appearance CLEAR CLEAR    Specific Gravity 1 011 1 001 - 1 035    Ph 6 5 5 0 - 8 0    Glucose, Urine NEGATIVE NEGATIVE    Bilirubin, Urine NEGATIVE NEGATIVE    Ketone, Urine NEGATIVE NEGATIVE    Blood, Urine NEGATIVE NEGATIVE    Protein, Urine TRACE (A) NEGATIVE    SL AMB NITRITES URINE, QUAL   NEGATIVE NEGATIVE    Leukocyte Esterase NEGATIVE NEGATIVE    SL AMB WBC, URINE NONE SEEN < OR = 5 /HPF    RBC, Urine NONE SEEN < OR = 2 /HPF    Squamous Epithelial Cells NONE SEEN < OR = 5 /HPF    Bacteria, UA NONE SEEN NONE SEEN /HPF    Hyaline Casts NONE SEEN NONE SEEN /LPF    Comment(s)

## 2023-09-14 LAB
ALBUMIN SERPL-MCNC: 4.2 G/DL (ref 3.6–5.1)
ALBUMIN/CREAT UR: 104 MCG/MG CREAT
APPEARANCE UR: CLEAR
BACTERIA UR QL AUTO: ABNORMAL /HPF
BILIRUB UR QL STRIP: NEGATIVE
BUN SERPL-MCNC: 21 MG/DL (ref 7–25)
BUN/CREAT SERPL: ABNORMAL (CALC) (ref 6–22)
CALCIUM SERPL-MCNC: 9.1 MG/DL (ref 8.6–10.3)
CHLORIDE SERPL-SCNC: 105 MMOL/L (ref 98–110)
CO2 SERPL-SCNC: 26 MMOL/L (ref 20–32)
COLOR UR: YELLOW
CREAT SERPL-MCNC: 0.82 MG/DL (ref 0.7–1.35)
CREAT UR-MCNC: 69 MG/DL (ref 20–320)
CREAT UR-MCNC: 69 MG/DL (ref 20–320)
GFR/BSA.PRED SERPLBLD CYS-BASED-ARV: 99 ML/MIN/1.73M2
GLUCOSE SERPL-MCNC: 109 MG/DL (ref 65–99)
GLUCOSE UR QL STRIP: NEGATIVE
HGB UR QL STRIP: NEGATIVE
HYALINE CASTS #/AREA URNS LPF: ABNORMAL /LPF
KETONES UR QL STRIP: NEGATIVE
LEUKOCYTE ESTERASE UR QL STRIP: NEGATIVE
MAGNESIUM SERPL-MCNC: 2.1 MG/DL (ref 1.5–2.5)
MICROALBUMIN UR-MCNC: 7.2 MG/DL
MYELOPEROXIDASE AB SER IA-ACNC: <1 AI
NITRITE UR QL STRIP: NEGATIVE
PH UR STRIP: 7.5 [PH] (ref 5–8)
PHOSPHATE SERPL-MCNC: 2.8 MG/DL (ref 2.5–4.5)
POTASSIUM SERPL-SCNC: 4.4 MMOL/L (ref 3.5–5.3)
PROT UR QL STRIP: ABNORMAL
PROT UR-MCNC: 21 MG/DL (ref 5–25)
PROT/CREAT UR: 0.3 MG/MG CREAT (ref 0.03–0.15)
PROT/CREAT UR: 304 MG/G CREAT (ref 25–148)
PROTEINASE3 AB SER IA-ACNC: 26.3 AI
RBC #/AREA URNS HPF: ABNORMAL /HPF
SODIUM SERPL-SCNC: 137 MMOL/L (ref 135–146)
SP GR UR STRIP: 1.02 (ref 1–1.03)
SQUAMOUS #/AREA URNS HPF: ABNORMAL /HPF
WBC #/AREA URNS HPF: ABNORMAL /HPF

## 2023-09-14 NOTE — PROGRESS NOTES
NEPHROLOGY OFFICE VISIT   Mitchel Starks 59 y.o. male MRN: [id number]  9/15/2023    Reason for Visit: History of microscopic polyangiitis    ASSESSMENT and PLAN:  It was a pleasure evaluating your patient in the office today. Thank you for allowing our team to participate in the care of Mr. Mitchel Starks. Please do not hesitate to contact our team if further issues/questions shall arise in the interim.      # Chronic kidney disease stage I  # History of microscopic polyangiitis  - Patient has history of SHONNA/RPGN secondary to microscopic polyangiitis in 2014/2015 status post kidney biopsy at that time and briefly required dialysis  - Patient also had plasma exchange, steroids and cyclophosphamide at that time  - Since then he had significant renal recovery with most recent creatinine 0.7 in 03/2023  - Urinalysis: No hematuria, trace proteinuria 03/2023  - Proteinuria: Urine protein to creatinine ratio 304 mg/g 09/2023 stable from 309 mg 03/2023 improved from 344 mg 07/2022 improved from 1500 mg 04/2022  - Urine albumin to creatinine ratio 104 mg/g 09/2023 improved from 141 mg 07/2022  - NE-3 antibody 26.3 09/2023 improved from 30 03/2023 improved from 40 07/2022  - He is currently maintained on azathioprine per rheumatology  - Currently there is no suggestion of renal vasculitis activity based on stability of renal function, absence of hematuria, improvement of proteinuria and improvement of NE-3 antibody  - No indication for kidney biopsy at this time  - Continue to monitor urinalysis, urine protein excretion, NE-3 antibody level every 6 months    # Proteinuria  - Urine protein to creatinine ratio 304 mg/g 09/2023 stable from 309 mg 03/2023 improved from 344 mg 07/2022 improved from 1500 mg 04/2022  - Urine protein to creatinine ratio was 1.5 g in 04/2022 which has now significantly improved  - It appears that proteinuria has improved with RAAS blockade and would continue RAAS blockade at this time  - We have also added chlorthalidone which has antiproteinuric properties  - Management of hypertension as below    # Hypertension/Volume   - Target Goal: Less than 120/80 per KDIGO guidelines   - Volume status: Euvolemic  - Status: Blood pressure currently above goal  - Current antihypertensive regimen: Olmesartan 40 mg daily, amlodipine 10 mg daily, Flomax 0.4 mg daily, chlorthalidone 12.5 mg on MWF schedule  - Changes: Increase chlorthalidone to 12.5 mg daily  - Check BMP/magnesium in 1 month    # Renal cyst  - 2.4 cm simple cyst in the upper pole of the left kidney  - No further work-up needed    # LUTS  - Lower urinary tract symptoms have currently improved on Flomax  - Continue Flomax 0.4 mg at bedtime    # Follow-up  - Follow-up labs in 1 month and then in 6 months  - Office visit in 6 months    HPI:  Since last visit: He has been losing weight. Otherwise feeling well. Denies dyspnea. Denies leg swelling. Denies any trouble with urination. 69-year-old male with history of hypertension, hyperlipidemia, microscopic polyangiitis, mononeuritis multiplex, episode of acute kidney injury/RPGN requiring dialysis, steroid-induced hyperglycemia, left above-knee amputation in 2016 presents for regular follow-up. He had kidney biopsy when he had episode of acute kidney injury requiring dialysis in 2014/2015. He also required steroids, cyclophosphamide and plasma exchange at that time. Since then he has had good renal recovery with most recent serum creatinine within normal limits. He is currently maintained on azathioprine per rheumatology. He is currently following up with rheumatology. PATIENT INSTRUCTIONS:    Patient Instructions   Blood pressure is still above goal.  Lets increase chlorthalidone to 12.5 mg which will be half tablet to every day. Repeat blood work in 1 month after increasing the dose of chlorthalidone.   Repeat blood work and urine test in 6 months before next office visit as we have been doing previously. OBJECTIVE:  Current Weight: Weight - Scale: 82.2 kg (181 lb 3.2 oz)  Vitals:    09/15/23 1056   BP: 142/98   BP Location: Right arm   Patient Position: Sitting   Cuff Size: Adult   Pulse: 83   Weight: 82.2 kg (181 lb 3.2 oz)   Height: 6' (1.829 m)    Body mass index is 24.58 kg/m². REVIEW OF SYSTEMS:    Review of Systems   Constitutional: Negative for chills and fever. HENT: Negative for ear pain and sore throat. Eyes: Negative for pain and visual disturbance. Respiratory: Negative for cough and shortness of breath. Cardiovascular: Negative for chest pain and palpitations. Gastrointestinal: Negative for abdominal pain and vomiting. Genitourinary: Negative for dysuria and hematuria. Musculoskeletal: Negative for arthralgias and back pain. Skin: Negative for color change and rash. Neurological: Negative for seizures and syncope. All other systems reviewed and are negative. Past Medical History:   Diagnosis Date   • ESRD (end stage renal disease) ([address])     from vasculitis- now good gfr. off of HD   • Hyperlipidemia    • Hypertension    • Hyperthyroidism        Past Surgical History:   Procedure Laterality Date   • AMPUTATION ABOVE KNEE (AKA)     • CHOLECYSTECTOMY     • IR CHOLANGIOGRAM/PTC WITHOUT DRAIN  3/12/2014   • TONSILLECTOMY     • US GUIDANCE  1/30/2014       Family History   Problem Relation Age of Onset   • Cirrhosis Father         liver         Social History     Substance and Sexual Activity   Alcohol Use Not Currently     Social History     Substance and Sexual Activity   Drug Use Not Currently     Social History     Tobacco Use   Smoking Status Every Day   • Packs/day: 0.50   • Types: Cigarettes   Smokeless Tobacco Never       PHYSICAL EXAM:      Physical Exam  Constitutional:       Appearance: Normal appearance. HENT:      Head: Normocephalic and atraumatic. Mouth/Throat:      Mouth: Mucous membranes are moist.      Pharynx: Oropharynx is clear. Cardiovascular:      Rate and Rhythm: Normal rate and regular rhythm. Pulses: Normal pulses. Heart sounds: Normal heart sounds. Pulmonary:      Effort: Pulmonary effort is normal.      Breath sounds: Normal breath sounds. Abdominal:      General: Bowel sounds are normal.      Palpations: Abdomen is soft. Musculoskeletal:         General: Normal range of motion. Right lower leg: No edema. Comments: Left above-knee amputation   Skin:     General: Skin is warm and dry. Neurological:      General: No focal deficit present. Mental Status: He is alert and oriented to person, place, and time. Mental status is at baseline. Psychiatric:         Mood and Affect: Mood normal.         Behavior: Behavior normal.         Thought Content:  Thought content normal.         Judgment: Judgment normal.         Medications:    Current Outpatient Medications:   •  amLODIPine (NORVASC) 10 mg tablet, Take 1 tablet (10 mg total) by mouth daily, Disp: 30 tablet, Rfl: 5  •  aspirin 81 mg chewable tablet, Chew 81 mg daily, Disp: , Rfl:   •  atorvastatin (LIPITOR) 20 mg tablet, Take 40 mg by mouth every 24 hours, Disp: , Rfl:   •  azaTHIOprine (IMURAN) 100 MG tablet, Take 100 mg by mouth, Disp: , Rfl:   •  chlorthalidone 25 mg tablet, Take 0.5 tablets (12.5 mg total) by mouth 3 (three) times a week, Disp: 18 tablet, Rfl: 3  •  gabapentin (NEURONTIN) 100 mg capsule, Take 300 mg by mouth in the morning, Disp: , Rfl:   •  MULTIPLE VITAMIN PO, every 24 hours, Disp: , Rfl:   •  Naproxen Sodium (Aleve) 220 MG CAPS, Take 220 mg by mouth daily at bedtime, Disp: , Rfl:   •  olmesartan (BENICAR) 40 mg tablet, TAKE 1 TABLET BY MOUTH EVERY DAY, Disp: 90 tablet, Rfl: 2  •  tamsulosin (FLOMAX) 0.4 mg, Take 0.4 mg by mouth every 24 hours, Disp: , Rfl:   •  VITAMIN D PO, Take 1,000 Units by mouth every 24 hours, Disp: , Rfl:   •  azaTHIOprine 50 mg/mL in flavored oral syrup vehicle, Take 100 mg by mouth in the morning (Patient not taking: Reported on 11/21/2022), Disp: , Rfl:     Laboratory Results:  Results from last 7 days   Lab Units 09/13/23  0941   POTASSIUM mmol/L 4.4   CHLORIDE mmol/L 105   CO2 mmol/L 26   BUN mg/dL 21   CREATININE mg/dL 0.82   CALCIUM mg/dL 9.1   MAGNESIUM mg/dL 2.1       Results for orders placed or performed in visit on 09/13/23   Microalbumin, Random Urine (W/Creatinine)   Result Value Ref Range    Creatinine, Urine 69 20 - 320 mg/dL    Albumin,U,Random 7.2 See Note: mg/dL    Microalb/Creat Ratio 104 (H) <30 mcg/mg creat   Magnesium   Result Value Ref Range    Magnesium, Serum 2.1 1.5 - 2.5 mg/dL   Renal function panel   Result Value Ref Range    Glucose, Random 109 (H) 65 - 99 mg/dL    BUN 21 7 - 25 mg/dL    Creatinine 0.82 0.70 - 1.35 mg/dL    eGFR 99 > OR = 60 mL/min/1.73m2    SL AMB BUN/CREATININE RATIO SEE NOTE: 6 - 22 (calc)    Sodium 137 135 - 146 mmol/L    Potassium 4.4 3.5 - 5.3 mmol/L    Chloride 105 98 - 110 mmol/L    CO2 26 20 - 32 mmol/L    Calcium 9.1 8.6 - 10.3 mg/dL    Phosphorus, Serum 2.8 2.5 - 4.5 mg/dL    Albumin 4.2 3.6 - 5.1 g/dL   Urinalysis with microscopic   Result Value Ref Range    Color UA YELLOW YELLOW    Urine Appearance CLEAR CLEAR    Specific Gravity 1.016 1.001 - 1.035    Ph 7.5 5.0 - 8.0    Glucose, Urine NEGATIVE NEGATIVE    Bilirubin, Urine NEGATIVE NEGATIVE    Ketone, Urine NEGATIVE NEGATIVE    Blood, Urine NEGATIVE NEGATIVE    Protein, Urine TRACE (A) NEGATIVE    Nitrites Urine NEGATIVE NEGATIVE    Leukocyte Esterase NEGATIVE NEGATIVE    SL AMB WBC, URINE NONE SEEN < OR = 5 /HPF    RBC, Urine NONE SEEN < OR = 2 /HPF    Squamous Epithelial Cells NONE SEEN < OR = 5 /HPF    Bacteria, UA NONE SEEN NONE SEEN /HPF    Hyaline Casts NONE SEEN NONE SEEN /LPF    Comment(s)     Antimyeloperoxidase Antibody   Result Value Ref Range    MPO AB <1.0 AI   AntiProteinase-3 Antibody   Result Value Ref Range    AL-3 AB 26.3 (H) AI   Protein, Total w/Creat, Random Urine   Result Value Ref Range    Creatinine, Urine 69 20 - 320 mg/dL    Protein/Creat Ratio 304 (H) 25 - 148 mg/g creat    Protein/Creat Ratio 0.304 (H) 0.025 - 0.148 mg/mg creat    Total Protein, Urine 21 5 - 25 mg/dL

## 2023-09-15 ENCOUNTER — OFFICE VISIT (OUTPATIENT)
Dept: NEPHROLOGY | Facility: CLINIC | Age: 63
End: 2023-09-15

## 2023-09-15 VITALS
DIASTOLIC BLOOD PRESSURE: 98 MMHG | HEART RATE: 83 BPM | SYSTOLIC BLOOD PRESSURE: 142 MMHG | HEIGHT: 72 IN | BODY MASS INDEX: 24.54 KG/M2 | WEIGHT: 181.2 LBS

## 2023-09-15 DIAGNOSIS — R39.9 LOWER URINARY TRACT SYMPTOMS (LUTS): ICD-10-CM

## 2023-09-15 DIAGNOSIS — M31.7 MICROSCOPIC POLYANGIITIS (HCC): Primary | ICD-10-CM

## 2023-09-15 DIAGNOSIS — N18.1 BENIGN HYPERTENSION WITH CKD (CHRONIC KIDNEY DISEASE) STAGE I: ICD-10-CM

## 2023-09-15 DIAGNOSIS — I12.9 BENIGN HYPERTENSION WITH CKD (CHRONIC KIDNEY DISEASE) STAGE I: ICD-10-CM

## 2023-09-15 DIAGNOSIS — N28.1 RENAL CYST: ICD-10-CM

## 2023-09-15 DIAGNOSIS — N18.1 STAGE 1 CHRONIC KIDNEY DISEASE: ICD-10-CM

## 2023-09-15 DIAGNOSIS — R80.9 PROTEINURIA, UNSPECIFIED TYPE: ICD-10-CM

## 2023-09-15 RX ORDER — COVID-19 ANTIGEN TEST
220 KIT MISCELLANEOUS
COMMUNITY

## 2023-09-15 NOTE — PATIENT INSTRUCTIONS
Blood pressure is still above goal.  Lets increase chlorthalidone to 12.5 mg which will be half tablet to every day. Repeat blood work in 1 month after increasing the dose of chlorthalidone. Repeat blood work and urine test in 6 months before next office visit as we have been doing previously.

## 2023-09-21 ENCOUNTER — APPOINTMENT (EMERGENCY)
Dept: CT IMAGING | Facility: HOSPITAL | Age: 63
End: 2023-09-21
Payer: COMMERCIAL

## 2023-09-21 ENCOUNTER — HOSPITAL ENCOUNTER (EMERGENCY)
Facility: HOSPITAL | Age: 63
Discharge: HOME | End: 2023-09-21
Attending: EMERGENCY MEDICINE
Payer: COMMERCIAL

## 2023-09-21 VITALS
OXYGEN SATURATION: 97 % | DIASTOLIC BLOOD PRESSURE: 104 MMHG | SYSTOLIC BLOOD PRESSURE: 187 MMHG | TEMPERATURE: 98 F | HEART RATE: 81 BPM | RESPIRATION RATE: 18 BRPM

## 2023-09-21 DIAGNOSIS — S61.411A LACERATION OF RIGHT HAND: Primary | ICD-10-CM

## 2023-09-21 DIAGNOSIS — S61.412A LACERATION OF HAND, LEFT: ICD-10-CM

## 2023-09-21 PROCEDURE — 90715 TDAP VACCINE 7 YRS/> IM: CPT | Performed by: EMERGENCY MEDICINE

## 2023-09-21 PROCEDURE — 90471 IMMUNIZATION ADMIN: CPT

## 2023-09-21 PROCEDURE — 99283 EMERGENCY DEPT VISIT LOW MDM: CPT

## 2023-09-21 PROCEDURE — G1004 CDSM NDSC: HCPCS

## 2023-09-21 PROCEDURE — 70450 CT HEAD/BRAIN W/O DYE: CPT

## 2023-09-21 PROCEDURE — 99284 EMERGENCY DEPT VISIT MOD MDM: CPT | Performed by: EMERGENCY MEDICINE

## 2023-09-21 PROCEDURE — 12001 RPR S/N/AX/GEN/TRNK 2.5CM/<: CPT | Performed by: EMERGENCY MEDICINE

## 2023-09-21 RX ORDER — LIDOCAINE HYDROCHLORIDE 10 MG/ML
10 INJECTION, SOLUTION EPIDURAL; INFILTRATION; INTRACAUDAL; PERINEURAL ONCE
Status: COMPLETED | OUTPATIENT
Start: 2023-09-21 | End: 2023-09-21

## 2023-09-21 RX ADMIN — TETANUS TOXOID, REDUCED DIPHTHERIA TOXOID AND ACELLULAR PERTUSSIS VACCINE, ADSORBED 0.5 ML: 5; 2.5; 8; 8; 2.5 SUSPENSION INTRAMUSCULAR at 15:44

## 2023-09-21 RX ADMIN — LIDOCAINE HYDROCHLORIDE 10 ML: 10 INJECTION, SOLUTION EPIDURAL; INFILTRATION; INTRACAUDAL; PERINEURAL at 15:44

## 2023-09-21 NOTE — ED PROVIDER NOTES
History  Chief Complaint   Patient presents with   • Hand Laceration     Pt presents to the ed after cutting a limb on a tree and the limb hitting the patient in the hands and top of the head, reports lacerations to the hands and laceration to the forehead, [facility] then [facility] here, + aspirin, + head strike, - LOC     This is a 71-year-old male who presents to the emergency department for a laceration to his hands. The patient states he was  cutting down a limb of a tree when the limb fell and hit him on his hands and his head. He has 2 lacerations to the hands. He  denies loss of consciousness. He does take aspirin. He denies fevers or chills. Denies neck pain. He denies chest pain or trouble breathing. He does not remember his last tetanus shot. Prior to Admission Medications   Prescriptions Last Dose Informant Patient Reported? Taking?    MULTIPLE VITAMIN PO  Self Yes No   Sig: every 24 hours   Naproxen Sodium (Aleve) 220 MG CAPS   Yes No   Sig: Take 220 mg by mouth daily at bedtime   VITAMIN D PO  Self Yes No   Sig: Take 1,000 Units by mouth every 24 hours   amLODIPine (NORVASC) 10 mg tablet  Self No No   Sig: Take 1 tablet (10 mg total) by mouth daily   aspirin 81 mg chewable tablet  Self Yes No   Sig: Chew 81 mg daily   atorvastatin (LIPITOR) 20 mg tablet  Self Yes No   Sig: Take 40 mg by mouth every 24 hours   azaTHIOprine (IMURAN) 100 MG tablet  Self Yes No   Sig: Take 100 mg by mouth   azaTHIOprine 50 mg/mL in flavored oral syrup vehicle  Self Yes No   Sig: Take 100 mg by mouth in the morning   Patient not taking: Reported on 11/21/2022   chlorthalidone 25 mg tablet   No No   Sig: Take 0.5 tablets (12.5 mg total) by mouth 3 (three) times a week   gabapentin (NEURONTIN) 100 mg capsule  Self Yes No   Sig: Take 300 mg by mouth in the morning   olmesartan (BENICAR) 40 mg tablet  Self No No   Sig: TAKE 1 TABLET BY MOUTH EVERY DAY   tamsulosin (FLOMAX) 0.4 mg  Self Yes No   Sig: Take 0.4 mg by mouth every 24 hours      Facility-Administered Medications: None       Past Medical History:   Diagnosis Date   • ESRD (end stage renal disease) ([address])     from vasculitis- now good gfr. off of HD   • Hyperlipidemia    • Hypertension    • Hyperthyroidism        Past Surgical History:   Procedure Laterality Date   • AMPUTATION ABOVE KNEE (AKA)     • CHOLECYSTECTOMY     • IR CHOLANGIOGRAM/PTC WITHOUT DRAIN  3/12/2014   • TONSILLECTOMY     • US GUIDANCE  1/30/2014       Family History   Problem Relation Age of Onset   • Cirrhosis Father         liver      I have reviewed and agree with the history as documented. E-Cigarette/Vaping     E-Cigarette/Vaping Substances     Social History     Tobacco Use   • Smoking status: Every Day     Packs/day: 0.50     Types: Cigarettes   • Smokeless tobacco: Never   Substance Use Topics   • Alcohol use: Not Currently   • Drug use: Not Currently       Review of Systems   All other systems reviewed and are negative.       Physical Exam  Physical Exam  Constitutional:  Vital signs reviewed, patient appears nontoxic, no acute distress  Eyes: Pupils equal round reactive to light and accommodation, extraocular muscles intact  HEENT: trachea midline, no JVD, moist mucous membranes  Respiratory: lung sounds clear throughout, no rhonchi, no rales  Cardiovascular: regular rate rhythm, no murmurs or rubs  Abdomen: soft, nontender, nondistended, no rebound or guarding  Back: no CVA tenderness, normal inspection  Extremities: no edema, pulses equal in all 4 extremities  Neuro: awake, alert, oriented, no focal weakness  Skin: warm, dry,  small abrasion to the right forehead, 1 cm laceration to the right  Third finger, 1 cm laceration to the  left, no tendon involvement, full flexion and extension at each joint to the finger,  normal sensation    Vital Signs  ED Triage Vitals   Temperature Pulse Respirations Blood Pressure SpO2   09/21/23 1524 09/21/23 1525 09/21/23 1525 09/21/23 1525 09/21/23 1525   98 °F (36.7 °C) 81 18 (!) 187/104 97 %      Temp Source Heart Rate Source Patient Position - Orthostatic VS BP Location FiO2 (%)   09/21/23 1524 09/21/23 1525 09/21/23 1525 09/21/23 1525 --   Oral Monitor Sitting Right arm       Pain Score       --                  Vitals:    09/21/23 1525   BP: (!) 187/104   Pulse: 81   Patient Position - Orthostatic VS: Sitting         Visual Acuity      ED Medications  Medications   tetanus-diphtheria-acellular pertussis (BOOSTRIX) IM injection 0.5 mL (0.5 mL Intramuscular Given 9/21/23 1544)   lidocaine (PF) (XYLOCAINE-MPF) 1 % injection 10 mL (10 mL Infiltration Given by Other 9/21/23 1544)       Diagnostic Studies  Results Reviewed     None                 CT head without contrast   Final Result by Jj Nicolas MD (09/21 1618)      No intracranial hemorrhage or calvarial fracture. Workstation performed: [id number]                    Procedures  Universal Protocol:  Consent: Verbal consent obtained. Consent given by: patient    Laceration repair    Date/Time: 9/21/2023 4:49 PM    Performed by: Armando Gonzalez DO  Authorized by: Armando Gonzalez DO  Body area: upper extremity  Location details: right long finger  Laceration length: 1 cm  Tendon involvement: none  Nerve involvement: none  Vascular damage: no  Anesthesia: local infiltration    Anesthesia:  Local Anesthetic: lidocaine 1% without epinephrine  Anesthetic total: 3 mL    Wound Dehiscence:  Superficial Wound Dehiscence: simple closure      Procedure Details:  Irrigation solution: saline  Irrigation method: syringe  Skin closure: 4-0 nylon  Number of sutures: 3  Technique: simple  Approximation: close  Approximation difficulty: simple  Patient tolerance: Patient tolerated the procedure well with no immediate complications    Universal Protocol:  Consent: Verbal consent obtained.   Consent given by: patient    Laceration repair    Date/Time: 9/21/2023 4:53 PM    Performed by: Kwesi Russell DO  Authorized by: Kwesi Russell DO  Body area: upper extremity  Location details: left index finger  Laceration length: 1 cm  Tendon involvement: none  Nerve involvement: none  Vascular damage: no  Anesthesia: local infiltration    Anesthesia:  Local Anesthetic: lidocaine 1% without epinephrine      Procedure Details:  Irrigation solution: saline  Irrigation method: syringe  Skin closure: 4-0 nylon  Number of sutures: 3  Technique: simple  Approximation: close  Approximation difficulty: simple  Patient tolerance: Patient tolerated the procedure well with no immediate complications               ED Course  ED Course as of 09/21/23 1734   Thu Sep 21, 2023   1650 The patient had a CAT scan that was read as no traumatic injury. Patient had a laceration repair at bedside. He will be discharged with follow-up in 7 to 10 days for suture removal.                               SBIRT 22yo+    Flowsheet Row Most Recent Value   Initial Alcohol Screen: US AUDIT-C     1. How often do you have a drink containing alcohol? 0 Filed at: 09/21/2023 1524   2. How many drinks containing alcohol do you have on a typical day you are drinking? 0 Filed at: 09/21/2023 1524   3a. Male UNDER 65: How often do you have five or more drinks on one occasion? 0 Filed at: 09/21/2023 1524   3b. FEMALE Any Age, or MALE 65+: How often do you have 4 or more drinks on one occassion? 0 Filed at: 09/21/2023 1524   Audit-C Score 0 Filed at: 09/21/2023 1524   VALERIA: How many times in the past year have you. .. Used an illegal drug or used a prescription medication for non-medical reasons? Never Filed at: 09/21/2023 1524                    Medical Decision Making  This is a 29-year-old male who presented to the emergency department complaining of a laceration. I considered head injury, laceration, abrasion. These and other diagnoses were considered.        Laceration of hand, left: acute illness or injury  Laceration of right hand: acute illness or injury  Amount and/or Complexity of Data Reviewed  Radiology: ordered. Risk  Prescription drug management. Disposition  Final diagnoses:   Laceration of right hand   Laceration of hand, left     Time reflects when diagnosis was documented in both MDM as applicable and the Disposition within this note     Time User Action Codes Description Comment    9/21/2023  4:51 PM Cresenciano Pawel Add [I62.521D] Hand laceration     9/21/2023  4:51 PM Cresenciano Pawel Add [T63.190W] Laceration of right hand     9/21/2023  4:51 PM Cresenciano Pawel Modify [I54.885S] Laceration of right hand     9/21/2023  4:51 PM Cresenciano Pawel Remove [S13.371X] Hand laceration     9/21/2023  4:51 PM Cresenciano Pawel Add [N91.346A] Laceration of hand, left       ED Disposition     ED Disposition   Discharge    Condition   Stable    Date/Time   Thu Sep 21, 2023  4:51 PM    [address] [place],[address] discharge to home/self care.                Follow-up Information     Follow up With Specialties Details Why Contact Info Additional Information    Jennifer Bell MD Internal Medicine In 1 week For suture removal [address]  [id number]  [facility] Emergency Department Emergency Medicine  If symptoms worsen [address] [phone]  [address] [facility] Emergency Department, [address] [place] [address] Dr, [address] [facility]          Discharge Medication List as of 9/21/2023  4:58 PM      CONTINUE these medications which have NOT CHANGED    Details   amLODIPine (NORVASC) 10 mg tablet Take 1 tablet (10 mg total) by mouth daily, Starting Thu 6/23/2022, Print      aspirin 81 mg chewable tablet Chew 81 mg daily, Historical Med      atorvastatin (LIPITOR) 20 mg tablet Take 40 mg by mouth every 24 hours, Historical Med      azaTHIOprine (IMURAN) 100 MG tablet Take 100 mg by mouth, Historical Med      azaTHIOprine 50 mg/mL in flavored oral syrup vehicle Take 100 mg by mouth in the morning, Historical Med      chlorthalidone 25 mg tablet Take 0.5 tablets (12.5 mg total) by mouth 3 (three) times a week, Starting Thu 4/27/2023, Until Fri 9/15/2023, Normal      gabapentin (NEURONTIN) 100 mg capsule Take 300 mg by mouth in the morning, Historical Med      MULTIPLE VITAMIN PO every 24 hours, Historical Med      Naproxen Sodium (Aleve) 220 MG CAPS Take 220 mg by mouth daily at bedtime, Historical Med      olmesartan (BENICAR) 40 mg tablet TAKE 1 TABLET BY MOUTH EVERY DAY, Normal      tamsulosin (FLOMAX) 0.4 mg Take 0.4 mg by mouth every 24 hours, Historical Med      VITAMIN D PO Take 1,000 Units by mouth every 24 hours, Historical Med             No discharge procedures on file.     PDMP Review     None          ED Provider  Electronically Signed by           Frank Harper DO  09/21/23 3149

## 2023-09-28 LAB
BUN SERPL-MCNC: 17 MG/DL (ref 7–25)
BUN/CREAT SERPL: NORMAL (CALC) (ref 6–22)
CALCIUM SERPL-MCNC: 9.5 MG/DL (ref 8.6–10.3)
CHLORIDE SERPL-SCNC: 103 MMOL/L (ref 98–110)
CO2 SERPL-SCNC: 29 MMOL/L (ref 20–32)
CREAT SERPL-MCNC: 0.73 MG/DL (ref 0.7–1.35)
GFR/BSA.PRED SERPLBLD CYS-BASED-ARV: 103 ML/MIN/1.73M2
GLUCOSE SERPL-MCNC: 127 MG/DL (ref 65–139)
MAGNESIUM SERPL-MCNC: 2 MG/DL (ref 1.5–2.5)
POTASSIUM SERPL-SCNC: 4.1 MMOL/L (ref 3.5–5.3)
SODIUM SERPL-SCNC: 138 MMOL/L (ref 135–146)

## 2023-10-02 ENCOUNTER — TELEPHONE (OUTPATIENT)
Dept: NEPHROLOGY | Facility: CLINIC | Age: 63
End: 2023-10-02

## 2023-10-02 NOTE — TELEPHONE ENCOUNTER
Called and spoke to the patient to relay the message above. Patient expressed understanding and had no further questions or concerns at this time. States AM blood pressure has been around 140/90 and PM blood pressure has been around 130/80.  Does not log his BP readings

## 2023-10-02 NOTE — TELEPHONE ENCOUNTER
----- Message from Chelly Colon MD sent at 9/29/2023  5:43 PM EDT -----  Kidney function and electrolytes are stable after increasing the dose of chlorthalidone. Continue chlorthalidone every day. Please ask the patient if he has seen any improvement in his blood pressure control. Enck you.

## 2023-10-06 NOTE — TELEPHONE ENCOUNTER
Attempted to call the patient regarding the message above. No answer and voicemail is full. Will try again later.

## 2023-10-09 NOTE — TELEPHONE ENCOUNTER
Called and spoke to the patient to relay the message above. Patient expressed understanding and had no further questions or concerns at this time. Agreeable to sending in BP log in 1 week.

## 2024-02-01 ENCOUNTER — TELEPHONE (OUTPATIENT)
Dept: NEPHROLOGY | Facility: CLINIC | Age: 64
End: 2024-02-01

## 2024-02-16 ENCOUNTER — TELEPHONE (OUTPATIENT)
Dept: RHEUMATOLOGY | Facility: CLINIC | Age: 64
End: 2024-02-16

## 2024-02-27 ENCOUNTER — TELEPHONE (OUTPATIENT)
Dept: NEPHROLOGY | Facility: CLINIC | Age: 64
End: 2024-02-27

## 2024-02-27 ENCOUNTER — OFFICE VISIT (OUTPATIENT)
Dept: RHEUMATOLOGY | Facility: CLINIC | Age: 64
End: 2024-02-27
Payer: COMMERCIAL

## 2024-02-27 ENCOUNTER — LAB (OUTPATIENT)
Dept: LAB | Facility: CLINIC | Age: 64
End: 2024-02-27
Payer: COMMERCIAL

## 2024-02-27 ENCOUNTER — TELEPHONE (OUTPATIENT)
Age: 64
End: 2024-02-27

## 2024-02-27 VITALS
BODY MASS INDEX: 25.19 KG/M2 | SYSTOLIC BLOOD PRESSURE: 190 MMHG | WEIGHT: 186 LBS | HEIGHT: 72 IN | DIASTOLIC BLOOD PRESSURE: 100 MMHG

## 2024-02-27 DIAGNOSIS — M31.7 MICROSCOPIC POLYANGIITIS (HCC): Primary | ICD-10-CM

## 2024-02-27 DIAGNOSIS — R76.8 PR3 ANCA ANTIBODIES PRESENT: ICD-10-CM

## 2024-02-27 DIAGNOSIS — M31.7 MICROSCOPIC POLYANGIITIS (HCC): ICD-10-CM

## 2024-02-27 DIAGNOSIS — N18.1 BENIGN HYPERTENSION WITH CKD (CHRONIC KIDNEY DISEASE) STAGE I: ICD-10-CM

## 2024-02-27 DIAGNOSIS — G62.9 NEUROPATHY: ICD-10-CM

## 2024-02-27 DIAGNOSIS — I12.9 BENIGN HYPERTENSION WITH CKD (CHRONIC KIDNEY DISEASE) STAGE I: ICD-10-CM

## 2024-02-27 DIAGNOSIS — R80.9 PROTEINURIA, UNSPECIFIED TYPE: ICD-10-CM

## 2024-02-27 DIAGNOSIS — N18.1 STAGE 1 CHRONIC KIDNEY DISEASE: ICD-10-CM

## 2024-02-27 DIAGNOSIS — Z79.899 HIGH RISK MEDICATION USE: ICD-10-CM

## 2024-02-27 LAB
ALBUMIN SERPL BCP-MCNC: 4.3 G/DL (ref 3.5–5)
ALP SERPL-CCNC: 57 U/L (ref 34–104)
ALT SERPL W P-5'-P-CCNC: 9 U/L (ref 7–52)
ANION GAP SERPL CALCULATED.3IONS-SCNC: 7 MMOL/L
AST SERPL W P-5'-P-CCNC: 13 U/L (ref 13–39)
BACTERIA UR QL AUTO: ABNORMAL /HPF
BASOPHILS # BLD AUTO: 0.11 THOUSANDS/ÂΜL (ref 0–0.1)
BASOPHILS NFR BLD AUTO: 2 % (ref 0–1)
BILIRUB SERPL-MCNC: 0.5 MG/DL (ref 0.2–1)
BILIRUB UR QL STRIP: NEGATIVE
BUN SERPL-MCNC: 21 MG/DL (ref 5–25)
CALCIUM SERPL-MCNC: 9.7 MG/DL (ref 8.4–10.2)
CHLORIDE SERPL-SCNC: 103 MMOL/L (ref 96–108)
CLARITY UR: CLEAR
CO2 SERPL-SCNC: 29 MMOL/L (ref 21–32)
COLOR UR: YELLOW
CREAT SERPL-MCNC: 0.87 MG/DL (ref 0.6–1.3)
CREAT UR-MCNC: 126.3 MG/DL
CREAT UR-MCNC: 126.8 MG/DL
CRP SERPL QL: 2.2 MG/L
EOSINOPHIL # BLD AUTO: 0.19 THOUSAND/ÂΜL (ref 0–0.61)
EOSINOPHIL NFR BLD AUTO: 3 % (ref 0–6)
ERYTHROCYTE [DISTWIDTH] IN BLOOD BY AUTOMATED COUNT: 12.6 % (ref 11.6–15.1)
ERYTHROCYTE [SEDIMENTATION RATE] IN BLOOD: 24 MM/HOUR (ref 0–19)
GFR SERPL CREATININE-BSD FRML MDRD: 92 ML/MIN/1.73SQ M
GLUCOSE SERPL-MCNC: 112 MG/DL (ref 65–140)
GLUCOSE UR STRIP-MCNC: ABNORMAL MG/DL
HCT VFR BLD AUTO: 51.9 % (ref 36.5–49.3)
HGB BLD-MCNC: 17.5 G/DL (ref 12–17)
HGB UR QL STRIP.AUTO: NEGATIVE
IMM GRANULOCYTES # BLD AUTO: 0.03 THOUSAND/UL (ref 0–0.2)
IMM GRANULOCYTES NFR BLD AUTO: 0 % (ref 0–2)
KETONES UR STRIP-MCNC: NEGATIVE MG/DL
LEUKOCYTE ESTERASE UR QL STRIP: NEGATIVE
LYMPHOCYTES # BLD AUTO: 1.19 THOUSANDS/ÂΜL (ref 0.6–4.47)
LYMPHOCYTES NFR BLD AUTO: 17 % (ref 14–44)
MAGNESIUM SERPL-MCNC: 2.2 MG/DL (ref 1.9–2.7)
MCH RBC QN AUTO: 31 PG (ref 26.8–34.3)
MCHC RBC AUTO-ENTMCNC: 33.7 G/DL (ref 31.4–37.4)
MCV RBC AUTO: 92 FL (ref 82–98)
MICROALBUMIN UR-MCNC: 563.4 MG/L
MICROALBUMIN/CREAT 24H UR: 446 MG/G CREATININE (ref 0–30)
MONOCYTES # BLD AUTO: 0.68 THOUSAND/ÂΜL (ref 0.17–1.22)
MONOCYTES NFR BLD AUTO: 10 % (ref 4–12)
NEUTROPHILS # BLD AUTO: 4.74 THOUSANDS/ÂΜL (ref 1.85–7.62)
NEUTS SEG NFR BLD AUTO: 68 % (ref 43–75)
NITRITE UR QL STRIP: NEGATIVE
NON-SQ EPI CELLS URNS QL MICRO: ABNORMAL /HPF
NRBC BLD AUTO-RTO: 0 /100 WBCS
PH UR STRIP.AUTO: 6 [PH]
PHOSPHATE SERPL-MCNC: 3.7 MG/DL (ref 2.3–4.1)
PLATELET # BLD AUTO: 182 THOUSANDS/UL (ref 149–390)
PMV BLD AUTO: 10.6 FL (ref 8.9–12.7)
POTASSIUM SERPL-SCNC: 4.3 MMOL/L (ref 3.5–5.3)
PROT SERPL-MCNC: 7.3 G/DL (ref 6.4–8.4)
PROT UR STRIP-MCNC: ABNORMAL MG/DL
PROT UR-MCNC: 86 MG/DL
PROT/CREAT UR: 0.68 MG/G{CREAT} (ref 0–0.1)
RBC # BLD AUTO: 5.65 MILLION/UL (ref 3.88–5.62)
RBC #/AREA URNS AUTO: ABNORMAL /HPF
SODIUM SERPL-SCNC: 139 MMOL/L (ref 135–147)
SP GR UR STRIP.AUTO: 1.02 (ref 1–1.03)
UROBILINOGEN UR STRIP-ACNC: <2 MG/DL
WBC # BLD AUTO: 6.94 THOUSAND/UL (ref 4.31–10.16)
WBC #/AREA URNS AUTO: ABNORMAL /HPF

## 2024-02-27 PROCEDURE — 85025 COMPLETE CBC W/AUTO DIFF WBC: CPT | Performed by: PHYSICIAN ASSISTANT

## 2024-02-27 PROCEDURE — 86037 ANCA TITER EACH ANTIBODY: CPT

## 2024-02-27 PROCEDURE — 82570 ASSAY OF URINE CREATININE: CPT

## 2024-02-27 PROCEDURE — 99205 OFFICE O/P NEW HI 60 MIN: CPT | Performed by: PHYSICIAN ASSISTANT

## 2024-02-27 PROCEDURE — 81001 URINALYSIS AUTO W/SCOPE: CPT

## 2024-02-27 PROCEDURE — 84156 ASSAY OF PROTEIN URINE: CPT

## 2024-02-27 PROCEDURE — 83520 IMMUNOASSAY QUANT NOS NONAB: CPT

## 2024-02-27 PROCEDURE — 82043 UR ALBUMIN QUANTITATIVE: CPT

## 2024-02-27 PROCEDURE — 36415 COLL VENOUS BLD VENIPUNCTURE: CPT

## 2024-02-27 PROCEDURE — 83735 ASSAY OF MAGNESIUM: CPT

## 2024-02-27 PROCEDURE — 84100 ASSAY OF PHOSPHORUS: CPT

## 2024-02-27 PROCEDURE — 80053 COMPREHEN METABOLIC PANEL: CPT | Performed by: PHYSICIAN ASSISTANT

## 2024-02-27 PROCEDURE — 85652 RBC SED RATE AUTOMATED: CPT | Performed by: PHYSICIAN ASSISTANT

## 2024-02-27 PROCEDURE — 86140 C-REACTIVE PROTEIN: CPT | Performed by: PHYSICIAN ASSISTANT

## 2024-02-27 RX ORDER — PREGABALIN 50 MG/1
50 CAPSULE ORAL 2 TIMES DAILY
Qty: 60 CAPSULE | Refills: 5 | Status: SHIPPED | OUTPATIENT
Start: 2024-02-27 | End: 2024-03-28

## 2024-02-27 RX ORDER — AZATHIOPRINE 50 MG/1
50 TABLET ORAL 2 TIMES DAILY
Qty: 180 TABLET | Refills: 1 | Status: SHIPPED | OUTPATIENT
Start: 2024-02-27 | End: 2024-05-27

## 2024-02-27 NOTE — TELEPHONE ENCOUNTER
PA for Pregabalin    Submitted via    []CMM-KEY    [x][employer]-Case ID # [id number]   []Faxed to plan   []Other website    []Phone call Case ID #      Office notes sent, clinical questions answered. Awaiting determination    Turnaround time for your insurance to make a decision on your Prior Authorization can take 7-21 business days.

## 2024-02-27 NOTE — TELEPHONE ENCOUNTER
----- Message from Davion Simmons MD sent at 2/27/2024  1:29 PM EST -----  Kidney function remains stable with good GFR.  Electrolytes within normal limits.

## 2024-02-27 NOTE — PROGRESS NOTES
Assessment and Plan:  Mr. 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    Matthew was initially diagnosed with SHONNA/RPGN secondary to microscopic polyangiitis in [id number] status post renal biopsy and briefly required hemodialysis, PLEX, steroids, and cyclophosphamide.  He has been following with Dr. Simmons of nephrology and Dr. Buckley of rheumatology (the latter until provider's group home). He has been taking azathioprine 50 mg twice daily up until he ran out of the medication a few weeks ago and has remained stable/asymptomatic.    The patient does not have any signs or symptoms of active vasculitis on exam or any inflammatory joint symptoms.  I recommend that he update high risk medication monitoring labs now (in addition to the labs ordered by nephrology) and once return stable, will continue azathioprine 50 mg twice daily.  He will continue high risk medication monitoring labs every 3 months for toxicity monitoring purposes.  He is still experiencing neuropathic symptoms which are likely related to the vasculitis despite gabapentin.  We discussed that although he tried Lyrica about a decade ago, we will retrial this medication at a dose of 50 mg twice daily.  PDMP reviewed.    The patient had hypertension during today's visit, but asymptomatic in terms of headache, dizziness, shortness of breath, chest pain.  Upon discussion, discovered that he has been out of one of his blood pressure medications.  I recommend that he contact his nephrologist today in terms of further recommendations and management as well as follow-up appointment as directed for April, or sooner.    Follow-up in 3 months, or sooner  "as needed.    Plan:  Diagnoses and all orders for this visit:    Microscopic polyangiitis (HCC)  -     CBC and differential  -     Comprehensive metabolic panel  -     C-reactive protein  -     Sedimentation rate, automated  -     azaTHIOprine (IMURAN) 50 mg tablet; Take 1 tablet (50 mg total) by mouth 2 (two) times a day    PR3 ANCA antibodies present  -     azaTHIOprine (IMURAN) 50 mg tablet; Take 1 tablet (50 mg total) by mouth 2 (two) times a day    Neuropathy  -     pregabalin (LYRICA) 50 mg capsule; Take 1 capsule (50 mg total) by mouth 2 (two) times a day    High risk medication use  -     CBC and differential  -     Comprehensive metabolic panel  -     azaTHIOprine (IMURAN) 50 mg tablet; Take 1 tablet (50 mg total) by mouth 2 (two) times a day        I have personally reviewed prior notes, recent laboratory results, and pertinent films in PACS.     Activities as tolerated.   Exercise: try to maintain a low impact exercise regimen as much as possible. Walk for 30 minutes a day for at least 3 days a week.   Continue other medications as prescribed by PCP and other specialists.       RTC in 3 months      Follow-up plan: 3 months        Chief Complaint  No chief complaint on file.  \"Ran out of azathioprine\"    BARNEY Mancilla is a 63 y.o.  male who presents as a Rheumatology consult referred by Braden Eastman MD for evaluation of polyarthralgia.    Patient states that he has been doing well on the azathioprine, but ran out of the medication in January.  He notices that recently his neuropathy has been worse in the bilateral hands.  He does not feel that he is experiencing much of an improvement with the gabapentin and also experiences diarrhea with this medication.    + Chronic sinusitis    Denies arthralgias/joint swelling/joint stiffness, myalgias, fevers, unintentional weight loss, hemoptysis, blood in the stool, bloody noses, hair loss, dry eyes, dry mouth, inflammatory eye disease, persistent/recurrent skin " rash, psoriasis, photosensitivity, mouth/nose ulcers, swollen glands, pleuritic chest pain, abdominal pain, vomiting, diarrhea, blood in stools, inflammatory bowel disease, or Raynaud's symptoms.      Review of Systems  Review of Systems  Constitutional: Negative for weight change, fevers, chills, night sweats.  ENT/Mouth: + Chronic sinusitis.  Negative for hearing changes, ear pain, hoarseness, sore throat, rhinorrhea, swallowing difficulty.   Eyes: Negative for pain, redness, discharge, vision changes.   Cardiovascular: Negative for chest pain, SOB, palpitations.   Respiratory: Negative for cough, sputum, wheezing, dyspnea.   Gastrointestinal: Negative for nausea, vomiting, diarrhea, constipation, pain, heartburn.  Genitourinary: Negative for dysuria, urinary frequency, hematuria.   Musculoskeletal: As per HPI.  Skin: Negative for skin rash, color changes.   Neuro: Negative for weakness, numbness, tingling, loss of consciousness.   Psych: Negative for anxiety, depression.   Heme/Lymph: Negative for easy bruising, bleeding, lymphadenopathy.      Allergies  Allergies   Allergen Reactions    Amoxicillin-Pot Clavulanate Other (See Comments)       Home Medications    Current Outpatient Medications:     amLODIPine (NORVASC) 10 mg tablet, Take 1 tablet (10 mg total) by mouth daily, Disp: 30 tablet, Rfl: 5    aspirin 81 mg chewable tablet, Chew 81 mg daily, Disp: , Rfl:     atorvastatin (LIPITOR) 20 mg tablet, Take 40 mg by mouth every 24 hours, Disp: , Rfl:     azaTHIOprine (IMURAN) 50 mg tablet, Take 1 tablet (50 mg total) by mouth 2 (two) times a day, Disp: 180 tablet, Rfl: 1    MULTIPLE VITAMIN PO, every 24 hours, Disp: , Rfl:     Naproxen Sodium (Aleve) 220 MG CAPS, Take 220 mg by mouth daily at bedtime, Disp: , Rfl:     olmesartan (BENICAR) 40 mg tablet, TAKE 1 TABLET BY MOUTH EVERY DAY, Disp: 90 tablet, Rfl: 2    pregabalin (LYRICA) 50 mg capsule, Take 1 capsule (50 mg total) by mouth 2 (two) times a day, Disp: 60  capsule, Rfl: 5    tamsulosin (FLOMAX) 0.4 mg, Take 0.4 mg by mouth every 24 hours, Disp: , Rfl:     chlorthalidone 25 mg tablet, Take 0.5 tablets (12.5 mg total) by mouth 3 (three) times a week, Disp: 18 tablet, Rfl: 3    VITAMIN D PO, Take 1,000 Units by mouth every 24 hours (Patient not taking: Reported on 2/27/2024), Disp: , Rfl:     Past Medical History  Past Medical History:   Diagnosis Date    ESRD (end stage renal disease) (HCC)     from vasculitis- now good gfr. off of HD    Hyperlipidemia     Hypertension     Hyperthyroidism        Past Surgical History   Past Surgical History:   Procedure Laterality Date    AMPUTATION ABOVE KNEE (AKA)      CHOLECYSTECTOMY      IR CHOLANGIOGRAM/PTC WITHOUT DRAIN  3/12/2014    TONSILLECTOMY      US GUIDANCE  1/30/2014       Family History    Family History   Problem Relation Age of Onset    Cirrhosis Father         liver      No known family history of autoimmune or inflammatory diseases.    Social History  Occupation: Disabled  Social History     Substance and Sexual Activity   Alcohol Use Not Currently     Social History     Substance and Sexual Activity   Drug Use Not Currently     Social History     Tobacco Use   Smoking Status Every Day    Current packs/day: 0.50    Types: Cigarettes   Smokeless Tobacco Never       Objective:  Vitals:    02/27/24 1115   BP: (!) 190/100   Weight: 84.4 kg (186 lb)   Height: 6' (1.829 m)       Physical Exam  General: Well appearing, well nourished, in no acute distress. Oriented x 3, normal mood and affect.  Ambulating without difficulty.  Skin: Good turgor, no rash, unusual bruising or prominent lesions.  Hair: Normal texture and distribution.  Nails: Normal color, no deformities.  HEENT:  Head: Normocephalic, atraumatic.  Eyes: Conjunctiva clear, sclera non-icteric, EOM intact.  Nose: No external lesions, mucosa non-inflamed.  Mouth: Mucous membranes moist, no mucosal lesions.  Neck: Supple  Musculoskeletal:   No asymmetry or  deformities noted of bilateral upper and lower extremities.  No pain or swelling of joints bilaterally to include: shoulder, elbow, wrist, MCP I-V, PIP I-V, knee, ankle, MTP I-V.  Neurologic: Alert and oriented. No focal neurological deficits appreciated.   Psychiatric: Normal mood and affect.       Reviewed labs and imaging.    Imaging:   No results found.     Labs:   Appointment on 02/27/2024   Component Date Value Ref Range Status    Magnesium 02/27/2024 2.2  1.9 - 2.7 mg/dL Final    Phosphorus 02/27/2024 3.7  2.3 - 4.1 mg/dL Final   Office Visit on 02/27/2024   Component Date Value Ref Range Status    WBC 02/27/2024 6.94  4.31 - 10.16 Thousand/uL Final    RBC 02/27/2024 5.65 (H)  3.88 - 5.62 Million/uL Final    Hemoglobin 02/27/2024 17.5 (H)  12.0 - 17.0 g/dL Final    Hematocrit 02/27/2024 51.9 (H)  36.5 - 49.3 % Final    MCV 02/27/2024 92  82 - 98 fL Final    MCH 02/27/2024 31.0  26.8 - 34.3 pg Final    MCHC 02/27/2024 33.7  31.4 - 37.4 g/dL Final    RDW 02/27/2024 12.6  11.6 - 15.1 % Final    MPV 02/27/2024 10.6  8.9 - 12.7 fL Final    Platelets 02/27/2024 182  149 - 390 Thousands/uL Final    nRBC 02/27/2024 0  /100 WBCs Final    Neutrophils Relative 02/27/2024 68  43 - 75 % Final    Immat GRANS % 02/27/2024 0  0 - 2 % Final    Lymphocytes Relative 02/27/2024 17  14 - 44 % Final    Monocytes Relative 02/27/2024 10  4 - 12 % Final    Eosinophils Relative 02/27/2024 3  0 - 6 % Final    Basophils Relative 02/27/2024 2 (H)  0 - 1 % Final    Neutrophils Absolute 02/27/2024 4.74  1.85 - 7.62 Thousands/µL Final    Immature Grans Absolute 02/27/2024 0.03  0.00 - 0.20 Thousand/uL Final    Lymphocytes Absolute 02/27/2024 1.19  0.60 - 4.47 Thousands/µL Final    Monocytes Absolute 02/27/2024 0.68  0.17 - 1.22 Thousand/µL Final    Eosinophils Absolute 02/27/2024 0.19  0.00 - 0.61 Thousand/µL Final    Basophils Absolute 02/27/2024 0.11 (H)  0.00 - 0.10 Thousands/µL Final    Sodium 02/27/2024 139  135 - 147 mmol/L Final     Potassium 02/27/2024 4.3  3.5 - 5.3 mmol/L Final    Chloride 02/27/2024 103  96 - 108 mmol/L Final    CO2 02/27/2024 29  21 - 32 mmol/L Final    ANION GAP 02/27/2024 7  mmol/L Final    BUN 02/27/2024 21  5 - 25 mg/dL Final    Creatinine 02/27/2024 0.87  0.60 - 1.30 mg/dL Final    Standardized to IDMS reference method    Glucose 02/27/2024 112  65 - 140 mg/dL Final    If the patient is fasting, the ADA then defines impaired fasting glucose as > 100 mg/dL and diabetes as > or equal to 123 mg/dL.    Calcium 02/27/2024 9.7  8.4 - 10.2 mg/dL Final    AST 02/27/2024 13  13 - 39 U/L Final    ALT 02/27/2024 9  7 - 52 U/L Final    Specimen collection should occur prior to Sulfasalazine administration due to the potential for falsely depressed results.     Alkaline Phosphatase 02/27/2024 57  34 - 104 U/L Final    Total Protein 02/27/2024 7.3  6.4 - 8.4 g/dL Final    Albumin 02/27/2024 4.3  3.5 - 5.0 g/dL Final    Total Bilirubin 02/27/2024 0.50  0.20 - 1.00 mg/dL Final    Use of this assay is not recommended for patients undergoing treatment with eltrombopag due to the potential for falsely elevated results.  N-acetyl-p-benzoquinone imine (metabolite of Acetaminophen) will generate erroneously low results in samples for patients that have taken an overdose of Acetaminophen.    eGFR 02/27/2024 92  ml/min/1.73sq m Final    CRP 02/27/2024 2.2  <3.0 mg/L Final    Sed Rate 02/27/2024 24 (H)  0 - 19 mm/hour Final   Orders Only on 09/28/2023   Component Date Value Ref Range Status    Magnesium, Serum 09/28/2023 2.0  1.5 - 2.5 mg/dL Final    Glucose, Random 09/28/2023 127  65 - 139 mg/dL Final    Comment:           Non-fasting reference interval         BUN 09/28/2023 17  7 - 25 mg/dL Final    Creatinine 09/28/2023 0.73  0.70 - 1.35 mg/dL Final    eGFR 09/28/2023 103  > OR = 60 mL/min/1.73m2 Final    SL AMB BUN/CREATININE RATIO 09/28/2023 SEE NOTE:  6 - 22 (calc) Final    Comment:    Not Reported: BUN and Creatinine are within      reference range.            Sodium 09/28/2023 138  135 - 146 mmol/L Final    Potassium 09/28/2023 4.1  3.5 - 5.3 mmol/L Final    Chloride 09/28/2023 103  98 - 110 mmol/L Final    CO2 09/28/2023 29  20 - 32 mmol/L Final    Calcium 09/28/2023 9.5  8.6 - 10.3 mg/dL Final   Orders Only on 09/13/2023   Component Date Value Ref Range Status    Creatinine, Urine 09/13/2023 69  20 - 320 mg/dL Final    Albumin,U,Random 09/13/2023 7.2  See Note: mg/dL Final    Comment: Reference Range:    Reference Range  Not established      Microalb/Creat Ratio 09/13/2023 104 (H)  <30 mcg/mg creat Final    Comment:    The ADA defines abnormalities in albumin  excretion as follows:     Albuminuria Category        Result (mcg/mg creatinine)     Normal to Mildly increased   <30  Moderately increased            Severely increased           > OR = 300     The ADA recommends that at least two of three  specimens collected within a 3-6 month period be  abnormal before considering a patient to be  within a diagnostic category.      Magnesium, Serum 09/13/2023 2.1  1.5 - 2.5 mg/dL Final    Glucose, Random 09/13/2023 109 (H)  65 - 99 mg/dL Final    Comment:               Fasting reference interval     For someone without known diabetes, a glucose value  between 100 and 125 mg/dL is consistent with  prediabetes and should be confirmed with a  follow-up test.         BUN 09/13/2023 21  7 - 25 mg/dL Final    Creatinine 09/13/2023 0.82  0.70 - 1.35 mg/dL Final    eGFR 09/13/2023 99  > OR = 60 mL/min/1.73m2 Final    SL AMB BUN/CREATININE RATIO 09/13/2023 SEE NOTE:  6 - 22 (calc) Final    Comment:    Not Reported: BUN and Creatinine are within     reference range.            Sodium 09/13/2023 137  135 - 146 mmol/L Final    Potassium 09/13/2023 4.4  3.5 - 5.3 mmol/L Final    Chloride 09/13/2023 105  98 - 110 mmol/L Final    CO2 09/13/2023 26  20 - 32 mmol/L Final    Calcium 09/13/2023 9.1  8.6 - 10.3 mg/dL Final    Phosphorus, Serum 09/13/2023 2.8   2.5 - 4.5 mg/dL Final    Albumin 09/13/2023 4.2  3.6 - 5.1 g/dL Final    Color UA 09/13/2023 YELLOW  YELLOW Final    Urine Appearance 09/13/2023 CLEAR  CLEAR Final    Specific Gravity 09/13/2023 1.016  1.001 - 1.035 Final    Ph 09/13/2023 7.5  5.0 - 8.0 Final    Glucose, Urine 09/13/2023 NEGATIVE  NEGATIVE Final    Bilirubin, Urine 09/13/2023 NEGATIVE  NEGATIVE Final    Ketone, Urine 09/13/2023 NEGATIVE  NEGATIVE Final    Blood, Urine 09/13/2023 NEGATIVE  NEGATIVE Final    Protein, Urine 09/13/2023 TRACE (A)  NEGATIVE Final    Nitrites Urine 09/13/2023 NEGATIVE  NEGATIVE Final    Leukocyte Esterase 09/13/2023 NEGATIVE  NEGATIVE Final    SL AMB WBC, URINE 09/13/2023 NONE SEEN  < OR = 5 /HPF Final    RBC, Urine 09/13/2023 NONE SEEN  < OR = 2 /HPF Final    Squamous Epithelial Cells 09/13/2023 NONE SEEN  < OR = 5 /HPF Final    Bacteria, UA 09/13/2023 NONE SEEN  NONE SEEN /HPF Final    Hyaline Casts 09/13/2023 NONE SEEN  NONE SEEN /LPF Final    Comment(s) 09/13/2023    Final    Comment: This urine was analyzed for the presence of WBC,   RBC, bacteria, casts, and other formed elements.   Only those elements seen were reported.            MPO AB 09/13/2023 <1.0  AI Final    Comment:      Value        Interpretation       -----        --------------       <1.0         No Antibody Detected       > or = 1.0   Antibody Detected          Autoantibodies to myeloperoxidase (MPO) are commonly  associated with the following small-vessel  vasculitides: microscopic polyangiitis,  polyarteritis nodosa, Churg-Rosa syndrome,  necrotizing and crescentic glomerulonephritis and  occasionally granulomatosis with polyangiitis  (GPA, Wegener's). The perinuclear IFA pattern,  (p-ANCA) is based largely on autoantibody to   myeloperoxidase which serves as the primary antigen.  These autoantibodies are present in active disease.         LA-3 AB 09/13/2023 26.3 (H)  AI Final    Comment: Since patient sera may contain heterogeneous  antibody  populations, caution should be utilized in interpreting  results >8.0 due to varying degrees of non-linearity.       Value        Interpretation       -----        --------------       <1.0         No Antibody Detected       > or = 1.0   Antibody Detected          Autoantibodies to proteinase-3 (MS-3) are accepted as  characteristic for granulomatosis with polyangiitis  (GPA, Wegener's), and are detectable in 95% of the  histologically proven cases. The cytoplasmic IFA  pattern, (c-ANCA), is based largely on autoantibody to  MS-3 which serves as the primary antigen.  These autoantibodies are present in active disease.         Creatinine, Urine 09/13/2023 69  20 - 320 mg/dL Final    Protein/Creat Ratio 09/13/2023 304 (H)  25 - 148 mg/g creat Final    Protein/Creat Ratio 09/13/2023 0.304 (H)  0.025 - 0.148 mg/mg creat Final    Total Protein, Urine 09/13/2023 21  5 - 25 mg/dL Final         Jl Ritter PA-C  Rheumatology

## 2024-02-27 NOTE — TELEPHONE ENCOUNTER
PA for Pregabalin Approved   Date(s) approved 1/1/24-12/31/24  Case # [id number]    Patient advised by [] [employer] Message                      [x] Phone call       Pharmacy advised by [x]Fax                                     []Phone call    Approval letter scanned into [employer] Yes

## 2024-02-28 DIAGNOSIS — M31.7 MICROSCOPIC POLYANGIITIS (HCC): ICD-10-CM

## 2024-02-28 DIAGNOSIS — R80.9 PROTEINURIA, UNSPECIFIED TYPE: ICD-10-CM

## 2024-02-28 DIAGNOSIS — N18.1 BENIGN HYPERTENSION WITH CKD (CHRONIC KIDNEY DISEASE) STAGE I: ICD-10-CM

## 2024-02-28 DIAGNOSIS — I10 PRIMARY HYPERTENSION: ICD-10-CM

## 2024-02-28 DIAGNOSIS — I12.9 BENIGN HYPERTENSION WITH CKD (CHRONIC KIDNEY DISEASE) STAGE I: ICD-10-CM

## 2024-02-28 DIAGNOSIS — N18.1 STAGE 1 CHRONIC KIDNEY DISEASE: ICD-10-CM

## 2024-02-28 DIAGNOSIS — R80.1 PERSISTENT PROTEINURIA: ICD-10-CM

## 2024-02-28 RX ORDER — AMLODIPINE BESYLATE 10 MG/1
10 TABLET ORAL DAILY
Qty: 30 TABLET | Refills: 5 | Status: SHIPPED | OUTPATIENT
Start: 2024-02-28

## 2024-02-28 RX ORDER — CHLORTHALIDONE 25 MG/1
12.5 TABLET ORAL DAILY
Qty: 45 TABLET | Refills: 3 | Status: SHIPPED | OUTPATIENT
Start: 2024-02-28 | End: 2024-05-28

## 2024-02-28 RX ORDER — OLMESARTAN MEDOXOMIL 40 MG/1
40 TABLET ORAL DAILY
Qty: 90 TABLET | Refills: 3 | Status: SHIPPED | OUTPATIENT
Start: 2024-02-28 | End: 2024-05-28

## 2024-02-29 LAB — PROTEINASE3 AB SER IA-ACNC: >8 UNITS (ref 0–0.9)

## 2024-03-01 ENCOUNTER — TELEPHONE (OUTPATIENT)
Dept: NEPHROLOGY | Facility: CLINIC | Age: 64
End: 2024-03-01

## 2024-03-01 DIAGNOSIS — M31.7 MICROSCOPIC POLYANGIITIS (HCC): Primary | ICD-10-CM

## 2024-03-01 LAB
C-ANCA TITR SER IF: ABNORMAL TITER
MYELOPEROXIDASE AB SER IA-ACNC: <0.2 UNITS (ref 0–0.9)
MYELOPEROXIDASE AB SER IA-ACNC: <0.2 UNITS (ref 0–0.9)
P-ANCA ATYPICAL TITR SER IF: ABNORMAL TITER
P-ANCA TITR SER IF: ABNORMAL TITER
PROTEINASE3 AB SER IA-ACNC: >8 UNITS (ref 0–0.9)

## 2024-03-01 NOTE — TELEPHONE ENCOUNTER
Patient has remote history of microscopic polyangiitis.  He has been following up closely with nephrology and rheumatology.  His most recent c-ANCA is reported as 1:640; HI-3 antibody is reported as more than 8 but previously had a decreasing titer with 26 5 months ago.  [facility] so a quantification can be done on HI-3 antibody.  He currently has no hematuria.  His kidney function is within normal limits.  His proteinuria is worsening and that may be related to hypertension and being off of antihypertensives.  We will repeat renal function panel with urinalysis and microscopy early next week to make sure kidney function is not worsening.  We will hold off on kidney biopsy and wait for the results of above testing.    Lab Results   Component Value Date    SODIUM 139 02/27/2024    K 4.3 02/27/2024     02/27/2024    CO2 29 02/27/2024    BUN 21 02/27/2024    CREATININE 0.87 02/27/2024    GLUC 112 02/27/2024    CALCIUM 9.7 02/27/2024

## 2024-03-04 ENCOUNTER — TELEPHONE (OUTPATIENT)
Dept: NEPHROLOGY | Facility: CLINIC | Age: 64
End: 2024-03-04

## 2024-03-04 NOTE — TELEPHONE ENCOUNTER
----- Message from Davion Simmons MD sent at 3/1/2024  5:10 PM EST -----  Regarding: Please fax labs scripts to [facility]  Ordered a few tests for this patient.    1. ANCA to be done at [facility]  2. WI-3 antibody to be done at [facility]  3. Renal function panel, urinalysis, urine albumin to creatinine ratio and urine protein to creatinine ratio to be done at [facility]'s    Can you please send that tests to be done at [facility] via fax.  He will go to [facility] in [place] [address] [address], [place] [id number]

## 2024-03-06 ENCOUNTER — TELEPHONE (OUTPATIENT)
Dept: NEPHROLOGY | Facility: CLINIC | Age: 64
End: 2024-03-06

## 2024-03-06 NOTE — TELEPHONE ENCOUNTER
Patient informed. Requested to complete all labs at [facility]. Lab orders faxed over to [facility] on 248

## 2024-03-06 NOTE — TELEPHONE ENCOUNTER
----- Message from Davion Simmons MD sent at 3/6/2024  9:17 AM EST -----  Regarding: Lab reminder  Plz remind the patient to go to [facility] for ANCA and UT-3 test.  Please advise him to go to [facility] for rest of his testing.

## 2024-03-08 ENCOUNTER — TELEPHONE (OUTPATIENT)
Dept: NEPHROLOGY | Facility: CLINIC | Age: 64
End: 2024-03-08

## 2024-03-08 NOTE — TELEPHONE ENCOUNTER
Pt advised that kidney function is stable.  Urine protein improving.  No blood in urine.  Antibody testing pending.  Per Dr. Simmons, will call patient when results are available.    ----- Message from Davion Simmons MD sent at 3/8/2024  9:13 AM EST -----  Kidney function is looking good.  Amount of protein in the urine also looking better than when it was checked 10 days ago.  There is no blood in the urine which is reassuring.  Antibody test results are still pending and he will be updated when the results are available.  Continue current medications as such.  Thank you.

## 2024-03-09 LAB
ALBUMIN SERPL-MCNC: 4.5 G/DL (ref 3.6–5.1)
ALBUMIN/CREAT UR: 100 MCG/MG CREAT
APPEARANCE UR: CLEAR
BACTERIA UR QL AUTO: ABNORMAL /HPF
BILIRUB UR QL STRIP: NEGATIVE
BUN SERPL-MCNC: 19 MG/DL (ref 7–25)
BUN/CREAT SERPL: ABNORMAL (CALC) (ref 6–22)
CALCIUM SERPL-MCNC: 9.7 MG/DL (ref 8.6–10.3)
CHLORIDE SERPL-SCNC: 100 MMOL/L (ref 98–110)
CO2 SERPL-SCNC: 27 MMOL/L (ref 20–32)
COLOR UR: ABNORMAL
CREAT SERPL-MCNC: 0.8 MG/DL (ref 0.7–1.35)
CREAT UR-MCNC: 179 MG/DL (ref 20–320)
CREAT UR-MCNC: 179 MG/DL (ref 20–320)
GFR/BSA.PRED SERPLBLD CYS-BASED-ARV: 100 ML/MIN/1.73M2
GLUCOSE SERPL-MCNC: 131 MG/DL (ref 65–99)
GLUCOSE UR QL STRIP: NEGATIVE
HGB UR QL STRIP: NEGATIVE
HYALINE CASTS #/AREA URNS LPF: ABNORMAL /LPF
KETONES UR QL STRIP: NEGATIVE
LEUKOCYTE ESTERASE UR QL STRIP: NEGATIVE
MICROALBUMIN UR-MCNC: 17.9 MG/DL
MYELOPEROXIDASE AB SER IA-ACNC: <1 AI
NITRITE UR QL STRIP: NEGATIVE
PH UR STRIP: 6.5 [PH] (ref 5–8)
PHOSPHATE SERPL-MCNC: 3.9 MG/DL (ref 2.5–4.5)
POTASSIUM SERPL-SCNC: 4.4 MMOL/L (ref 3.5–5.3)
PROT UR QL STRIP: ABNORMAL
PROT UR-MCNC: 45 MG/DL (ref 5–25)
PROT/CREAT UR: 0.25 MG/MG CREAT (ref 0.03–0.15)
PROT/CREAT UR: 251 MG/G CREAT (ref 25–148)
PROTEINASE3 AB SER IA-ACNC: 28.9 AI
RBC #/AREA URNS HPF: ABNORMAL /HPF
SODIUM SERPL-SCNC: 138 MMOL/L (ref 135–146)
SP GR UR STRIP: 1.02 (ref 1–1.03)
SQUAMOUS #/AREA URNS HPF: ABNORMAL /HPF
WBC #/AREA URNS HPF: ABNORMAL /HPF

## 2024-03-13 ENCOUNTER — TELEPHONE (OUTPATIENT)
Dept: NEPHROLOGY | Facility: CLINIC | Age: 64
End: 2024-03-13

## 2024-03-13 NOTE — TELEPHONE ENCOUNTER
----- Message from Davion Simmons MD sent at 3/13/2024 12:46 PM EDT -----  Regarding: Lab results  All lab results reviewed.  Kidney function remains very stable.  There is no blood in the urine.  Amount of protein in the urine also looks better.  MD-3 antibody level looks stable.  At this juncture, I do not think that he is going through an active flare of vasculitis.  I would recommend he continues to take his current medications.  I would recommend checking renal function panel, urinalysis with microscopy, urine albumin to creatinine ratio, urine protein to creatinine ratio, ANCA panel and MD-3 antibody in 1 month for follow up (all tests should be done at [facility]).

## 2024-03-26 ENCOUNTER — OFFICE VISIT (OUTPATIENT)
Dept: NEPHROLOGY | Facility: CLINIC | Age: 64
End: 2024-03-26
Payer: COMMERCIAL

## 2024-03-26 VITALS
HEIGHT: 72 IN | WEIGHT: 184.8 LBS | SYSTOLIC BLOOD PRESSURE: 144 MMHG | HEART RATE: 64 BPM | BODY MASS INDEX: 25.03 KG/M2 | DIASTOLIC BLOOD PRESSURE: 84 MMHG

## 2024-03-26 DIAGNOSIS — R80.1 PERSISTENT PROTEINURIA: Primary | ICD-10-CM

## 2024-03-26 DIAGNOSIS — R80.9 PROTEINURIA, UNSPECIFIED TYPE: ICD-10-CM

## 2024-03-26 DIAGNOSIS — R31.29 MICROSCOPIC HEMATURIA: ICD-10-CM

## 2024-03-26 DIAGNOSIS — I12.9 BENIGN HYPERTENSION WITH CKD (CHRONIC KIDNEY DISEASE) STAGE I: ICD-10-CM

## 2024-03-26 DIAGNOSIS — N18.1 STAGE 1 CHRONIC KIDNEY DISEASE: ICD-10-CM

## 2024-03-26 DIAGNOSIS — N18.1 BENIGN HYPERTENSION WITH CKD (CHRONIC KIDNEY DISEASE) STAGE I: ICD-10-CM

## 2024-03-26 DIAGNOSIS — I10 PRIMARY HYPERTENSION: ICD-10-CM

## 2024-03-26 DIAGNOSIS — M31.7 MICROSCOPIC POLYANGIITIS (HCC): ICD-10-CM

## 2024-03-26 PROCEDURE — 99214 OFFICE O/P EST MOD 30 MIN: CPT | Performed by: PHYSICIAN ASSISTANT

## 2024-03-26 RX ORDER — AMLODIPINE BESYLATE 10 MG/1
10 TABLET ORAL DAILY
Qty: 90 TABLET | Refills: 3 | Status: SHIPPED | OUTPATIENT
Start: 2024-03-26

## 2024-03-26 RX ORDER — CHLORTHALIDONE 25 MG/1
25 TABLET ORAL EVERY OTHER DAY
Start: 2024-03-26

## 2024-03-26 NOTE — PATIENT INSTRUCTIONS
CKD stage 1  - creatinine and electrolytes stable at baseline   - he does take aleve every night for arthritis pain    History of microscopic polyangiitis  - Patient has history of SHONNA/RPGN secondary to microscopic polyangiitis in 2014/2015 status post kidney biopsy at that time and briefly required dialysis  - plasma exchange, steroids and cyclophosphamide at that time   - PR3 antibody stable recently around 26-30 the past year  - He is currently maintained on azathioprine per rheumatology     Microscopic Hematuria  - none seen on most recent UA    Proteinuria  - microalbumin creatinine ratio is 100  - UPC ratio 0.25  - continue to monitor     Hypertension  - Current antihypertensive regimen: Olmesartan 40 mg daily, amlodipine 10 mg daily, Flomax 0.4 mg daily, chlorthalidone 12.5 mg daily  - home BP: 120-135/75-85    Neuropathy  - worsening  - on lyrica    Follow up with Dr. Simmons or an AP in 6 months.  Please call the office with any questions or concerns.

## 2024-03-26 NOTE — PROGRESS NOTES
Assessment and Plan:    Matthew was seen today for follow-up.    Diagnoses and all orders for this visit:    Persistent proteinuria    Primary hypertension  -     amLODIPine (NORVASC) 10 mg tablet; Take 1 tablet (10 mg total) by mouth daily    Microscopic polyangiitis (HCC)  -     AntiProteinase-3 Antibody; Future  -     Basic metabolic panel; Future  -     Urinalysis with microscopic; Future  -     Protein / creatinine ratio, urine; Future  -     chlorthalidone 25 mg tablet; Take 1 tablet (25 mg total) by mouth every other day    Stage 1 chronic kidney disease  -     chlorthalidone 25 mg tablet; Take 1 tablet (25 mg total) by mouth every other day    Proteinuria, unspecified type  -     chlorthalidone 25 mg tablet; Take 1 tablet (25 mg total) by mouth every other day    Benign hypertension with CKD (chronic kidney disease) stage I  -     chlorthalidone 25 mg tablet; Take 1 tablet (25 mg total) by mouth every other day    Microscopic hematuria      CKD stage 1  - creatinine and electrolytes stable at baseline   - he does take aleve every night for arthritis pain    History of microscopic polyangiitis  - Patient has history of SHONNA/RPGN secondary to microscopic polyangiitis in 2014/2015 status post kidney biopsy at that time and briefly required dialysis  - plasma exchange, steroids and cyclophosphamide at that time   - PR3 antibody stable recently around 26-30 the past year  - He is currently maintained on azathioprine per rheumatology     Microscopic Hematuria  - none seen on most recent UA    Proteinuria  - microalbumin creatinine ratio is 100  - UPC ratio 0.25  - continue to monitor     Hypertension  - Current antihypertensive regimen: Olmesartan 40 mg daily, amlodipine 10 mg daily, Flomax 0.4 mg daily, chlorthalidone 25 mg every other day.  - home BP: 120-135/75-85    Neuropathy  - worsening  - on lyrica    Follow up with Dr. Simmons or an AP in 6 months.  Please call the office with any questions or concerns.       Reason for Visit: Follow-up (Microscopic polyangiitis)    HPI: Matthew Mancilla is a 63 y.o. male who is here for follow up of chronic kidney disease/microscopic polyangiitis.  Patient was last seen in September 2023.  He is feeling well overall but his main complaint is worsening neuropathy.  He takes lyrica and aleve for pain.  He feels his neuropathy is getting worse however and it is very hard for him to accomplish fine motor skill tasks.  He is following up with rheumatology for this.     ROS: A complete review of systems was performed and was negative unless otherwise noted in the history of present illness.    Allergies:   Amoxicillin-pot clavulanate    Medications:     Current Outpatient Medications:     amLODIPine (NORVASC) 10 mg tablet, Take 1 tablet (10 mg total) by mouth daily, Disp: 90 tablet, Rfl: 3    aspirin 81 mg chewable tablet, Chew 81 mg daily, Disp: , Rfl:     atorvastatin (LIPITOR) 20 mg tablet, Take 40 mg by mouth every 24 hours, Disp: , Rfl:     azaTHIOprine (IMURAN) 50 mg tablet, Take 1 tablet (50 mg total) by mouth 2 (two) times a day, Disp: 180 tablet, Rfl: 1    chlorthalidone 25 mg tablet, Take 1 tablet (25 mg total) by mouth every other day, Disp: , Rfl:     MULTIPLE VITAMIN PO, every 24 hours, Disp: , Rfl:     Naproxen Sodium (Aleve) 220 MG CAPS, Take 220 mg by mouth daily at bedtime, Disp: , Rfl:     olmesartan (BENICAR) 40 mg tablet, Take 1 tablet (40 mg total) by mouth daily, Disp: 90 tablet, Rfl: 3    pregabalin (LYRICA) 50 mg capsule, Take 1 capsule (50 mg total) by mouth 2 (two) times a day, Disp: 60 capsule, Rfl: 5    tamsulosin (FLOMAX) 0.4 mg, Take 0.4 mg by mouth every 24 hours, Disp: , Rfl:     VITAMIN D PO, Take 1,000 Units by mouth every 24 hours (Patient not taking: Reported on 2/27/2024), Disp: , Rfl:     Past Medical History:   Diagnosis Date    ESRD (end stage renal disease) (HCC)     from vasculitis- now good gfr. off of HD    Hyperlipidemia     Hypertension      Hyperthyroidism      Past Surgical History:   Procedure Laterality Date    AMPUTATION ABOVE KNEE (AKA)      CHOLECYSTECTOMY      IR CHOLANGIOGRAM/PTC WITHOUT DRAIN  3/12/2014    TONSILLECTOMY      US GUIDANCE  1/30/2014     Family History   Problem Relation Age of Onset    Cirrhosis Father         liver       reports that he has been smoking cigarettes. He has never used smokeless tobacco. He reports that he does not currently use alcohol. He reports that he does not currently use drugs.    Physical Exam:   Vitals:    03/26/24 1052   BP: 144/84   BP Location: Left arm   Patient Position: Sitting   Cuff Size: Standard   Pulse: 64   Weight: 83.8 kg (184 lb 12.8 oz)   Height: 6' (1.829 m)     Body mass index is 25.06 kg/m².    General: NAD  Neuro: AAO  Skin: no rash  Eyes: anicteric  ENMT: mm moist  Neck: no masses  Respiratory: CTAB  Cardiovascular: RRR  Extremities: no edema  Gastrointestinal: soft nt nd    Procedure:  No results found for this or any previous visit.    Lab Results   Component Value Date    CALCIUM 9.7 03/07/2024    K 4.4 03/07/2024    CO2 27 03/07/2024     03/07/2024    BUN 19 03/07/2024    CREATININE 0.80 03/07/2024     I have personally reviewed the blood work as stated above and in my note.  I have personally reviewed renal note.

## 2024-06-06 ENCOUNTER — OFFICE VISIT (OUTPATIENT)
Dept: RHEUMATOLOGY | Facility: CLINIC | Age: 64
End: 2024-06-06
Payer: COMMERCIAL

## 2024-06-06 VITALS
BODY MASS INDEX: 24.11 KG/M2 | DIASTOLIC BLOOD PRESSURE: 80 MMHG | SYSTOLIC BLOOD PRESSURE: 130 MMHG | WEIGHT: 178 LBS | HEIGHT: 72 IN

## 2024-06-06 DIAGNOSIS — M31.7 MICROSCOPIC POLYANGIITIS (HCC): Primary | ICD-10-CM

## 2024-06-06 DIAGNOSIS — R76.8 PR3 ANCA ANTIBODIES PRESENT: ICD-10-CM

## 2024-06-06 DIAGNOSIS — Z79.899 HIGH RISK MEDICATION USE: ICD-10-CM

## 2024-06-06 DIAGNOSIS — G62.9 NEUROPATHY: ICD-10-CM

## 2024-06-06 PROCEDURE — 99214 OFFICE O/P EST MOD 30 MIN: CPT | Performed by: PHYSICIAN ASSISTANT

## 2024-06-06 RX ORDER — AZATHIOPRINE 50 MG/1
50 TABLET ORAL 2 TIMES DAILY
Qty: 180 TABLET | Refills: 1 | Status: SHIPPED | OUTPATIENT
Start: 2024-06-06 | End: 2024-09-04

## 2024-06-06 RX ORDER — ATORVASTATIN CALCIUM 40 MG/1
TABLET, FILM COATED ORAL
COMMUNITY
Start: 2024-05-31

## 2024-06-06 RX ORDER — AZATHIOPRINE 50 MG/1
50 TABLET ORAL 2 TIMES DAILY
COMMUNITY
Start: 2024-05-27 | End: 2024-06-06 | Stop reason: SDUPTHER

## 2024-06-06 RX ORDER — PREGABALIN 100 MG/1
100 CAPSULE ORAL 2 TIMES DAILY
Qty: 60 CAPSULE | Refills: 5 | Status: SHIPPED | OUTPATIENT
Start: 2024-06-06 | End: 2024-07-06

## 2024-06-06 NOTE — PROGRESS NOTES
Assessment and Plan:   Mr. 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     Matthew was initially diagnosed with SHONNA/RPGN secondary to microscopic polyangiitis in 8916-3356 status post renal biopsy and briefly required hemodialysis, PLEX, steroids, and cyclophosphamide.  He has been following with Dr. Simmons of nephrology and previously with Dr. Buckley of rheumatology (the latter until provider's jail). He has been maintained over time on azathioprine 50 mg twice daily.     The patient does not have any signs or symptoms of active vasculitis on exam or any inflammatory joint symptoms. Most recent labs within acceptable limits and urinalysis was bland.  Continue Q12 wk high risk medication monitoring labs and azathioprine 50 mg twice daily.  He is still experiencing neuropathic symptoms which are likely related to the vasculitis and has had some improvement with Lyrica, but not complete.  I recommend increasing from 50 mg twice daily to 100 mg twice daily and monitoring for improvement.  He will contact me if there is no improvement.    Follow-up in 1 year, or sooner as needed    Plan:  Diagnoses and all orders for this visit:    Microscopic polyangiitis (HCC)  -     CBC and differential; Standing  -     C-reactive protein; Standing  -     Comprehensive metabolic panel; Standing  -     Sedimentation rate, automated; Standing  -     azaTHIOprine (IMURAN) 50 mg tablet; Take 1 tablet (50 mg total) by mouth 2 (two) times a day    PR3 ANCA antibodies present  -     CBC and differential; Standing  -     C-reactive protein; Standing  -     Comprehensive metabolic panel; Standing  -     Sedimentation rate,  automated; Standing  -     azaTHIOprine (IMURAN) 50 mg tablet; Take 1 tablet (50 mg total) by mouth 2 (two) times a day    High risk medication use  -     CBC and differential; Standing  -     C-reactive protein; Standing  -     Comprehensive metabolic panel; Standing  -     Sedimentation rate, automated; Standing  -     azaTHIOprine (IMURAN) 50 mg tablet; Take 1 tablet (50 mg total) by mouth 2 (two) times a day    Neuropathy  -     azaTHIOprine (IMURAN) 50 mg tablet; Take 1 tablet (50 mg total) by mouth 2 (two) times a day  -     pregabalin (LYRICA) 100 mg capsule; Take 1 capsule (100 mg total) by mouth 2 (two) times a day    Other orders  -     Discontinue: azaTHIOprine (IMURAN) 50 mg tablet; Take 50 mg by mouth 2 (two) times a day  -     atorvastatin (LIPITOR) 40 mg tablet        I have personally reviewed prior notes, recent laboratory results, and pertinent films in PACS.     Activities as tolerated.   Exercise: try to maintain a low impact exercise regimen as much as possible. Walk for 30 minutes a day for at least 3 days a week.   Continue other medications as prescribed by PCP and other specialists.       RTC in 1 year     HPI  Mr. Mancilla is a 64-year-old male with past medical history significant for microscopic polyangiitis (WV-3 ab + 2015), RPGN 2014 (with history of SHONNA on HD 2015), microscopic hematuria, proteinuria, steroid-induced diabetes, mononeuritis multiplex, left leg arterial thrombus with amputation, and hypertension who presents for rheumatology follow up of polyarthralgia.     Most bothersome complaint is the numbness and tingling pain, specifically in his hands.  He stated after he started the Lyrica, he did notice some improvement, but not completely.  He does state that the Lyrica was more effective than the gabapentin otherwise.    He also notes weight loss and is planning for biopsy of his prostate.  No new rashes    The following portions of the patient's history were reviewed and updated  as appropriate: allergies, current medications, past family history, past medical history, past social history, past surgical history and problem list.      Review of Systems  Constitutional: +weight change  ENT/Mouth: +chronic sinusitis  Eyes: Negative for pain, redness, discharge, vision changes.   Cardiovascular: Negative for chest pain, SOB, palpitations.   Respiratory: Negative for cough, sputum, wheezing, dyspnea.   Gastrointestinal: Negative for nausea, vomiting, diarrhea, constipation, pain, heartburn.  Genitourinary: Negative for dysuria, urinary frequency, hematuria.   Musculoskeletal: As per HPI.  Skin: Negative for skin rash, color changes.   Neuro: Negative for weakness, numbness, tingling, loss of consciousness.   Psych: Negative for anxiety, depression.   Heme/Lymph: Negative for easy bruising, bleeding, lymphadenopathy.        Past Medical History:   Diagnosis Date    ESRD (end stage renal disease) (HCC)     from vasculitis- now good gfr. off of HD    Hyperlipidemia     Hypertension     Hyperthyroidism        Past Surgical History:   Procedure Laterality Date    AMPUTATION ABOVE KNEE (AKA)      CHOLECYSTECTOMY      IR CHOLANGIOGRAM/PTC WITHOUT DRAIN  3/12/2014    TONSILLECTOMY      US GUIDANCE  1/30/2014       Social History     Socioeconomic History    Marital status:      Spouse name: Not on file    Number of children: Not on file    Years of education: Not on file    Highest education level: Not on file   Occupational History    Not on file   Tobacco Use    Smoking status: Every Day     Current packs/day: 0.50     Types: Cigarettes    Smokeless tobacco: Never   Substance and Sexual Activity    Alcohol use: Not Currently    Drug use: Not Currently    Sexual activity: Not on file   Other Topics Concern    Not on file   Social History Narrative    Not on file     Social Determinants of Health     Financial Resource Strain: Not on file   Food Insecurity: Not on file   Transportation Needs: Not  on file   Physical Activity: Not on file   Stress: Not on file   Social Connections: Not on file   Intimate Partner Violence: Not on file   Housing Stability: Not on file       Family History   Problem Relation Age of Onset    Cirrhosis Father         liver        Allergies   Allergen Reactions    Amoxicillin-Pot Clavulanate Other (See Comments)         Current Outpatient Medications:     atorvastatin (LIPITOR) 40 mg tablet, , Disp: , Rfl:     azaTHIOprine (IMURAN) 50 mg tablet, Take 1 tablet (50 mg total) by mouth 2 (two) times a day, Disp: 180 tablet, Rfl: 1    pregabalin (LYRICA) 100 mg capsule, Take 1 capsule (100 mg total) by mouth 2 (two) times a day, Disp: 60 capsule, Rfl: 5    amLODIPine (NORVASC) 10 mg tablet, Take 1 tablet (10 mg total) by mouth daily, Disp: 90 tablet, Rfl: 3    aspirin 81 mg chewable tablet, Chew 81 mg daily, Disp: , Rfl:     atorvastatin (LIPITOR) 20 mg tablet, Take 40 mg by mouth every 24 hours, Disp: , Rfl:     chlorthalidone 25 mg tablet, Take 1 tablet (25 mg total) by mouth every other day, Disp: , Rfl:     MULTIPLE VITAMIN PO, every 24 hours, Disp: , Rfl:     Naproxen Sodium (Aleve) 220 MG CAPS, Take 220 mg by mouth daily at bedtime, Disp: , Rfl:     olmesartan (BENICAR) 40 mg tablet, Take 1 tablet (40 mg total) by mouth daily, Disp: 90 tablet, Rfl: 3    tamsulosin (FLOMAX) 0.4 mg, Take 0.4 mg by mouth every 24 hours, Disp: , Rfl:     VITAMIN D PO, Take 1,000 Units by mouth every 24 hours (Patient not taking: Reported on 2/27/2024), Disp: , Rfl:       Objective:    Vitals:    06/06/24 1051   BP: 130/80   Weight: 80.7 kg (178 lb)   Height: 6' (1.829 m)       Physical Exam  General: Well appearing, well nourished, in no acute distress. Oriented x 3, normal mood and affect.  Ambulating without difficulty.  Skin: Good turgor, no rash, unusual bruising or prominent lesions.  Hair: Normal texture and distribution.  Nails: Normal color, no deformities.  HEENT:  Head: Normocephalic,  atraumatic.  Eyes: Conjunctiva clear, sclera non-icteric, EOM intact.  Nose: No external lesions, mucosa non-inflamed.  Mouth: Mucous membranes moist, no mucosal lesions.  Neck: Supple  Musculoskeletal:   No tenderness to palpation or swelling of joints bilaterally to include: shoulder, elbow, wrist, MCP I-V, PIP I-V, knee, ankle, MTP I-V.  +amputation LLE appreciated; wearing prosthetic  Neurologic: Alert and oriented. No focal neurological deficits appreciated.   Psychiatric: Normal mood and affect.       Jl Ritter PA-C  Rheumatology

## 2024-09-12 LAB
ALBUMIN SERPL-MCNC: 4.4 G/DL (ref 3.6–5.1)
ALBUMIN/GLOB SERPL: 1.8 (CALC) (ref 1–2.5)
ALP SERPL-CCNC: 65 U/L (ref 35–144)
ALT SERPL-CCNC: 15 U/L (ref 9–46)
AST SERPL-CCNC: 15 U/L (ref 10–35)
BASOPHILS # BLD AUTO: 88 CELLS/UL (ref 0–200)
BASOPHILS NFR BLD AUTO: 1.1 %
BILIRUB SERPL-MCNC: 0.7 MG/DL (ref 0.2–1.2)
BUN SERPL-MCNC: 16 MG/DL (ref 7–25)
BUN/CREAT SERPL: ABNORMAL (CALC) (ref 6–22)
CALCIUM SERPL-MCNC: 9.9 MG/DL (ref 8.6–10.3)
CHLORIDE SERPL-SCNC: 106 MMOL/L (ref 98–110)
CO2 SERPL-SCNC: 29 MMOL/L (ref 20–32)
CREAT SERPL-MCNC: 0.7 MG/DL (ref 0.7–1.35)
CRP SERPL-MCNC: <3 MG/L
EOSINOPHIL # BLD AUTO: 200 CELLS/UL (ref 15–500)
EOSINOPHIL NFR BLD AUTO: 2.5 %
ERYTHROCYTE [DISTWIDTH] IN BLOOD BY AUTOMATED COUNT: 12.6 % (ref 11–15)
ERYTHROCYTE [SEDIMENTATION RATE] IN BLOOD BY WESTERGREN METHOD: 2 MM/H
GFR/BSA.PRED SERPLBLD CYS-BASED-ARV: 104 ML/MIN/1.73M2
GLOBULIN SER CALC-MCNC: 2.5 G/DL (CALC) (ref 1.9–3.7)
GLUCOSE SERPL-MCNC: 145 MG/DL (ref 65–99)
HCT VFR BLD AUTO: 50.6 % (ref 38.5–50)
HGB BLD-MCNC: 16.8 G/DL (ref 13.2–17.1)
LYMPHOCYTES # BLD AUTO: 1152 CELLS/UL (ref 850–3900)
LYMPHOCYTES NFR BLD AUTO: 14.4 %
MCH RBC QN AUTO: 32.2 PG (ref 27–33)
MCHC RBC AUTO-ENTMCNC: 33.2 G/DL (ref 32–36)
MCV RBC AUTO: 97.1 FL (ref 80–100)
MONOCYTES # BLD AUTO: 752 CELLS/UL (ref 200–950)
MONOCYTES NFR BLD AUTO: 9.4 %
NEUTROPHILS # BLD AUTO: 5808 CELLS/UL (ref 1500–7800)
NEUTROPHILS NFR BLD AUTO: 72.6 %
PLATELET # BLD AUTO: 184 THOUSAND/UL (ref 140–400)
PMV BLD REES-ECKER: 10.6 FL (ref 7.5–12.5)
POTASSIUM SERPL-SCNC: 4.6 MMOL/L (ref 3.5–5.3)
PROT SERPL-MCNC: 6.9 G/DL (ref 6.1–8.1)
RBC # BLD AUTO: 5.21 MILLION/UL (ref 4.2–5.8)
SODIUM SERPL-SCNC: 140 MMOL/L (ref 135–146)
WBC # BLD AUTO: 8 THOUSAND/UL (ref 3.8–10.8)

## 2024-11-05 ENCOUNTER — TELEPHONE (OUTPATIENT)
Dept: NEPHROLOGY | Facility: CLINIC | Age: 64
End: 2024-11-05

## 2024-11-05 NOTE — TELEPHONE ENCOUNTER
Spoke with patient reminding him to go for lab work before his appt on 11/11.  He said he already did it back in September.

## 2024-11-11 ENCOUNTER — OFFICE VISIT (OUTPATIENT)
Dept: NEPHROLOGY | Facility: CLINIC | Age: 64
End: 2024-11-11
Payer: MEDICARE

## 2024-11-11 VITALS
HEART RATE: 68 BPM | SYSTOLIC BLOOD PRESSURE: 132 MMHG | HEIGHT: 72 IN | BODY MASS INDEX: 24.38 KG/M2 | WEIGHT: 180 LBS | DIASTOLIC BLOOD PRESSURE: 86 MMHG

## 2024-11-11 DIAGNOSIS — N18.1 STAGE 1 CHRONIC KIDNEY DISEASE: ICD-10-CM

## 2024-11-11 DIAGNOSIS — N18.1 BENIGN HYPERTENSION WITH CKD (CHRONIC KIDNEY DISEASE) STAGE I: ICD-10-CM

## 2024-11-11 DIAGNOSIS — R80.1 PERSISTENT PROTEINURIA: ICD-10-CM

## 2024-11-11 DIAGNOSIS — M31.7 MICROSCOPIC POLYANGIITIS (HCC): Primary | ICD-10-CM

## 2024-11-11 DIAGNOSIS — N28.1 RENAL CYST: ICD-10-CM

## 2024-11-11 DIAGNOSIS — I12.9 BENIGN HYPERTENSION WITH CKD (CHRONIC KIDNEY DISEASE) STAGE I: ICD-10-CM

## 2024-11-11 PROCEDURE — 99214 OFFICE O/P EST MOD 30 MIN: CPT | Performed by: INTERNAL MEDICINE

## 2024-11-11 RX ORDER — AZATHIOPRINE 50 MG/1
50 TABLET ORAL 2 TIMES DAILY
COMMUNITY

## 2024-11-11 RX ORDER — CHLORTHALIDONE 25 MG/1
25 TABLET ORAL 3 TIMES WEEKLY
Qty: 36 TABLET | Refills: 3 | Status: SHIPPED | OUTPATIENT
Start: 2024-11-11 | End: 2025-02-09

## 2024-11-11 NOTE — PATIENT INSTRUCTIONS
Kidney numbers from September were in good range.    Blood pressure is currently above goal and would recommend taking chlorthalidone 3 times a week on Monday/Wednesday/Friday schedule.    Check blood test and urine testing in 1 month and then in 6 months.    Follow-up in the office in 6 months.

## 2024-11-11 NOTE — ASSESSMENT & PLAN NOTE
- Urine protein to creatinine ratio 250 mg/g 03/2024 improved from 1500 mg 04/2022  - Urine albumin to creatinine ratio 100 mg/g 03/2024  - It appears that proteinuria has improved with RAAS blockade and would continue RAAS blockade at this time  - He is also on chlorthalidone which has antiproteinuric properties  - Management of hypertension as below

## 2024-11-11 NOTE — PROGRESS NOTES
Ambulatory Visit  Name: Matthew Mancilla      : 1960      MRN: [id number]  Encounter Provider: Davion Simmons MD  Encounter Date: 2024   Encounter department: [facility]    Assessment & Plan  Microscopic polyangiitis (HCC)  - Patient has history of SHONNA/RPGN secondary to microscopic polyangiitis in  status post kidney biopsy at that time and briefly required dialysis  - Patient also had plasma exchange, steroids and cyclophosphamide at that time  - Since then he had significant renal recovery with most recent creatinine 0.70 2024  - Urinalysis: Negative RBC 2024  - Proteinuria: Urine protein to creatinine ratio 250 mg/g 2024 improved from 1500 mg 2022  - Urine albumin to creatinine ratio 100 mg/g 2024  - C-ANCA 1: 640 2024, UT-3 antibody 28.9 2024 overall improved from 30 2023 and 40 2022  - He is currently maintained on azathioprine per rheumatology  - Currently there is no suggestion of renal vasculitis activity based on stability of renal function  - No indication for kidney biopsy at this time  - Continue to monitor urinalysis, urine protein excretion, UT-3 antibody level every 6 months(labs are due at this time)  - Repeat lab work now and then in 6 months  - Office follow-up in 6 months  Stage 1 chronic kidney disease  - Management as above  Benign hypertension with CKD (chronic kidney disease) stage I  - Target Goal: Less than 120/80 per KDIGO guidelines   - Volume status: Euvolemic  - Status: Blood pressure currently above goal  - Home BP: Average 130/90  - Current antihypertensive regimen: Olmesartan 40 mg daily, amlodipine 10 mg daily, Flomax 0.4 mg daily, chlorthalidone 12.5 mg twice weekly   - Changes: Increase chlorthalidone to 12.5 mg M//  - Check RFP in 1 month   Persistent proteinuria  - Urine protein to creatinine ratio 250 mg/g 2024 improved from 1500 mg 2022  - Urine albumin to creatinine ratio 100 mg/g 2024  -  It appears that proteinuria has improved with RAAS blockade and would continue RAAS blockade at this time  - He is also on chlorthalidone which has antiproteinuric properties  - Management of hypertension as below  Renal cyst  - 2.4 cm simple cyst in the upper pole of the left kidney  - No further work-up needed    History of Present Illness     Matthew Mancilla is a 64 y.o. male       Since last visit: He has been doing well.  He denies dyspnea.  He denies leg swelling.  Denies any trouble with urination.     64-year-old male with history of hypertension, hyperlipidemia, microscopic polyangiitis, mononeuritis multiplex, episode of acute kidney injury/RPGN requiring dialysis, steroid-induced hyperglycemia, left above-knee amputation in 2016 presents for regular follow-up.  He had kidney biopsy when he had episode of acute kidney injury requiring dialysis in 2014/2015.  He also required steroids, cyclophosphamide and plasma exchange at that time.  Since then he has had good renal recovery with most recent serum creatinine within normal limits.  He is currently maintained on azathioprine per rheumatology.  He is currently following up with rheumatology.    History obtained from : patient  Review of Systems   Constitutional:  Negative for chills and fever.   HENT:  Negative for ear pain and sore throat.    Eyes:  Negative for pain and visual disturbance.   Respiratory:  Negative for cough and shortness of breath.    Cardiovascular:  Negative for chest pain and palpitations.   Gastrointestinal:  Negative for abdominal pain and vomiting.   Genitourinary:  Negative for dysuria and hematuria.   Musculoskeletal:  Negative for arthralgias and back pain.   Skin:  Negative for color change and rash.   Neurological:  Negative for seizures and syncope.   All other systems reviewed and are negative.    Past Medical History   Past Medical History:   Diagnosis Date    ESRD (end stage renal disease) (HCC)     from vasculitis- now good  gfr. off of HD    Hyperlipidemia     Hypertension     Hyperthyroidism      Past Surgical History:   Procedure Laterality Date    AMPUTATION ABOVE KNEE (AKA)      CHOLECYSTECTOMY      IR CHOLANGIOGRAM/PTC WITHOUT DRAIN  3/12/2014    TONSILLECTOMY      US GUIDANCE  1/30/2014     Family History   Problem Relation Age of Onset    Cirrhosis Father         liver      Current Outpatient Medications on File Prior to Visit   Medication Sig Dispense Refill    amLODIPine (NORVASC) 10 mg tablet Take 1 tablet (10 mg total) by mouth daily 90 tablet 3    aspirin 81 mg chewable tablet Chew 81 mg daily      atorvastatin (LIPITOR) 40 mg tablet       MULTIPLE VITAMIN PO every 24 hours      Naproxen Sodium (Aleve) 220 MG CAPS Take 220 mg by mouth daily at bedtime      olmesartan (BENICAR) 40 mg tablet Take 1 tablet (40 mg total) by mouth daily 90 tablet 3    pregabalin (LYRICA) 100 mg capsule Take 1 capsule (100 mg total) by mouth 2 (two) times a day 60 capsule 5    tamsulosin (FLOMAX) 0.4 mg Take 0.4 mg by mouth every 24 hours      [DISCONTINUED] chlorthalidone 25 mg tablet Take 1 tablet (25 mg total) by mouth every other day (Patient taking differently: Take 25 mg by mouth 2 (two) times a week)      atorvastatin (LIPITOR) 20 mg tablet Take 40 mg by mouth every 24 hours (Patient not taking: Reported on 11/11/2024)      VITAMIN D PO Take 1,000 Units by mouth every 24 hours (Patient not taking: Reported on 2/27/2024)       No current facility-administered medications on file prior to visit.     Allergies   Allergen Reactions    Amoxicillin-Pot Clavulanate Other (See Comments)          Objective     /86 (BP Location: Right arm, Patient Position: Sitting, Cuff Size: Adult)   Pulse 68   Ht 6' (1.829 m)   Wt 81.6 kg (180 lb)   BMI 24.41 kg/m²     Physical Exam  Vitals and nursing note reviewed.   Constitutional:       General: He is not in acute distress.     Appearance: He is well-developed.   HENT:      Head: Normocephalic and  atraumatic.   Eyes:      Conjunctiva/sclera: Conjunctivae normal.   Cardiovascular:      Rate and Rhythm: Normal rate and regular rhythm.      Pulses: Normal pulses.      Heart sounds: Normal heart sounds. No murmur heard.  Pulmonary:      Effort: Pulmonary effort is normal. No respiratory distress.      Breath sounds: Normal breath sounds.   Abdominal:      Palpations: Abdomen is soft.      Tenderness: There is no abdominal tenderness.   Musculoskeletal:         General: No swelling.      Cervical back: Neck supple.      Right lower leg: No edema.      Comments: Left AKA noted   Skin:     General: Skin is warm and dry.      Capillary Refill: Capillary refill takes less than 2 seconds.   Neurological:      Mental Status: He is alert.   Psychiatric:         Mood and Affect: Mood normal.

## 2024-11-11 NOTE — ASSESSMENT & PLAN NOTE
- Patient has history of SHONNA/RPGN secondary to microscopic polyangiitis in 2014/2015 status post kidney biopsy at that time and briefly required dialysis  - Patient also had plasma exchange, steroids and cyclophosphamide at that time  - Since then he had significant renal recovery with most recent creatinine 0.70 09/2024  - Urinalysis: Negative RBC 09/2024  - Proteinuria: Urine protein to creatinine ratio 250 mg/g 03/2024 improved from 1500 mg 04/2022  - Urine albumin to creatinine ratio 100 mg/g 03/2024  - C-ANCA 1: 640 02/2024, NH-3 antibody 28.9 03/2024 overall improved from 30 03/2023 and 40 07/2022  - He is currently maintained on azathioprine per rheumatology  - Currently there is no suggestion of renal vasculitis activity based on stability of renal function  - No indication for kidney biopsy at this time  - Continue to monitor urinalysis, urine protein excretion, NH-3 antibody level every 6 months(labs are due at this time)  - Repeat lab work now and then in 6 months  - Office follow-up in 6 months

## 2024-11-25 ENCOUNTER — TELEPHONE (OUTPATIENT)
Age: 64
End: 2024-11-25

## 2024-11-25 NOTE — TELEPHONE ENCOUNTER
Patient's PCP office contacted us regarding if Matthew is allowed to get his immunization vaccines.    Patient is unsure if he is allowed to get pneumonia, flu, etc vaccines.     Please call patient to advise him whether this is something he should do or not.

## 2024-11-26 NOTE — TELEPHONE ENCOUNTER
Spoke with patient about the following, he expressed understanding and thanked us for the call:    Please let them know that he is on immunosuppression for history of vasculitis.  He would be okay to get inactivated flu vaccine (common yearly vaccine), and typical pneumonia vaccine.  Inactivated vaccines would be okay.  He SHOULD NOT get LIVE vaccines.  If they have a specific vaccine that they want to give, please ask them the name of the vaccine and we can advise accordingly.

## 2025-05-13 ENCOUNTER — TELEPHONE (OUTPATIENT)
Dept: NEPHROLOGY | Facility: CLINIC | Age: 65
End: 2025-05-13

## 2025-05-13 NOTE — TELEPHONE ENCOUNTER
Spoke with patient reminding them to go for lab work before their appt on 5/19.  They expressed understanding and thanked us for the call.

## 2025-05-14 LAB
ALBUMIN SERPL-MCNC: 4.2 G/DL (ref 3.6–5.1)
ALBUMIN/CREAT UR: 850 MG/G CREAT
APPEARANCE UR: CLEAR
BACTERIA UR QL AUTO: ABNORMAL /HPF
BILIRUB UR QL STRIP: NEGATIVE
BUN SERPL-MCNC: 21 MG/DL (ref 7–25)
BUN/CREAT SERPL: ABNORMAL (CALC) (ref 6–22)
CALCIUM SERPL-MCNC: 8.9 MG/DL (ref 8.6–10.3)
CHLORIDE SERPL-SCNC: 102 MMOL/L (ref 98–110)
CO2 SERPL-SCNC: 27 MMOL/L (ref 20–32)
COLOR UR: YELLOW
CREAT SERPL-MCNC: 0.81 MG/DL (ref 0.7–1.35)
CREAT UR-MCNC: 132 MG/DL (ref 20–320)
CREAT UR-MCNC: 132 MG/DL (ref 20–320)
GFR/BSA.PRED SERPLBLD CYS-BASED-ARV: 98 ML/MIN/1.73M2
GLUCOSE SERPL-MCNC: 118 MG/DL (ref 65–99)
GLUCOSE UR QL STRIP: NEGATIVE
HGB UR QL STRIP: ABNORMAL
HYALINE CASTS #/AREA URNS LPF: ABNORMAL /LPF
KETONES UR QL STRIP: NEGATIVE
LEUKOCYTE ESTERASE UR QL STRIP: NEGATIVE
MICROALBUMIN UR-MCNC: 112.2 MG/DL
MYELOPEROXIDASE AB SER IA-ACNC: <1 AI
NITRITE UR QL STRIP: NEGATIVE
PH UR STRIP: 6 [PH] (ref 5–8)
PHOSPHATE SERPL-MCNC: 3 MG/DL (ref 2.5–4.5)
POTASSIUM SERPL-SCNC: 4.4 MMOL/L (ref 3.5–5.3)
PROT UR QL STRIP: ABNORMAL
PROT UR-MCNC: 169 MG/DL (ref 5–25)
PROT/CREAT UR: 1.28 MG/MG CREAT (ref 0.03–0.15)
PROT/CREAT UR: 1280 MG/G CREAT (ref 25–148)
PROTEINASE3 AB SER IA-ACNC: 21.9 AI
RBC #/AREA URNS HPF: ABNORMAL /HPF
SODIUM SERPL-SCNC: 138 MMOL/L (ref 135–146)
SP GR UR STRIP: 1.02 (ref 1–1.03)
SQUAMOUS #/AREA URNS HPF: ABNORMAL /HPF
WBC #/AREA URNS HPF: ABNORMAL /HPF

## 2025-05-15 ENCOUNTER — RESULTS FOLLOW-UP (OUTPATIENT)
Dept: NEPHROLOGY | Facility: CLINIC | Age: 65
End: 2025-05-15

## 2025-05-19 ENCOUNTER — OFFICE VISIT (OUTPATIENT)
Dept: NEPHROLOGY | Facility: CLINIC | Age: 65
End: 2025-05-19
Payer: COMMERCIAL

## 2025-05-19 VITALS
BODY MASS INDEX: 23.72 KG/M2 | HEIGHT: 73 IN | WEIGHT: 179 LBS | DIASTOLIC BLOOD PRESSURE: 96 MMHG | HEART RATE: 56 BPM | OXYGEN SATURATION: 98 % | SYSTOLIC BLOOD PRESSURE: 176 MMHG

## 2025-05-19 DIAGNOSIS — M31.7 MICROSCOPIC POLYANGIITIS (HCC): Primary | ICD-10-CM

## 2025-05-19 DIAGNOSIS — N18.1 BENIGN HYPERTENSION WITH CKD (CHRONIC KIDNEY DISEASE) STAGE I: ICD-10-CM

## 2025-05-19 DIAGNOSIS — R80.1 PERSISTENT PROTEINURIA: ICD-10-CM

## 2025-05-19 DIAGNOSIS — N18.1 STAGE 1 CHRONIC KIDNEY DISEASE: ICD-10-CM

## 2025-05-19 DIAGNOSIS — N28.1 RENAL CYST: ICD-10-CM

## 2025-05-19 DIAGNOSIS — I12.9 BENIGN HYPERTENSION WITH CKD (CHRONIC KIDNEY DISEASE) STAGE I: ICD-10-CM

## 2025-05-19 PROCEDURE — 99214 OFFICE O/P EST MOD 30 MIN: CPT | Performed by: INTERNAL MEDICINE

## 2025-05-19 RX ORDER — CHLORTHALIDONE 25 MG/1
25 TABLET ORAL 3 TIMES WEEKLY
Qty: 36 TABLET | Refills: 3 | Status: SHIPPED | OUTPATIENT
Start: 2025-05-19 | End: 2025-08-17

## 2025-05-19 RX ORDER — TAMSULOSIN HYDROCHLORIDE 0.4 MG/1
0.4 CAPSULE ORAL EVERY 24 HOURS
Qty: 90 CAPSULE | Refills: 3 | Status: SHIPPED | OUTPATIENT
Start: 2025-05-19 | End: 2025-08-17

## 2025-05-19 RX ORDER — AMLODIPINE BESYLATE 10 MG/1
10 TABLET ORAL DAILY
Qty: 90 TABLET | Refills: 3 | Status: SHIPPED | OUTPATIENT
Start: 2025-05-19

## 2025-05-19 RX ORDER — OLMESARTAN MEDOXOMIL 40 MG/1
40 TABLET ORAL DAILY
Qty: 90 TABLET | Refills: 3 | Status: SHIPPED | OUTPATIENT
Start: 2025-05-19 | End: 2025-08-17

## 2025-05-19 NOTE — PROGRESS NOTES
Name: Matthew Mancilla      : 1960      MRN: [id number]  Encounter Provider: Davion Simmons MD  Encounter Date: 2025   Encounter department: [facility]  :  Assessment & Plan  Microscopic polyangiitis (HCC)  - Patient has history of SHONNA/RPGN secondary to microscopic polyangiitis in  status post kidney biopsy at that time and briefly required dialysis  - Patient also had plasma exchange, steroids and cyclophosphamide at that time  - Since then he had significant renal recovery with most recent creatinine 0.81/GFR 98 2024  - Urinalysis: 0-2 RBC, 0-5 WBC 2025  - Proteinuria: UPCR 1280 mg/g 2024 increased from 250 mg/g 2024   - Albuminuria: UACR 850 mg/g 2025 increased from 100 mg/g 2024  - C-ANCA 1: 640 2024, [place]-3 antibody 21.9 2025 overall improved from 28.9 2024, 30 2023 and 40 2022, MPO negative  - Hep B/hep C negative as of 2022  - He is currently maintained on azathioprine per rheumatology  - Given increase in albuminuria/proteinuria, will do serological workup including C3/C4/KAYLEE/dsDNA/SPEP/UPEP/serum free light chain ratio  - Low threshold for kidney biopsy for further evaluation  - Would need better blood pressure control prior to kidney biopsy if needed  Persistent proteinuria  - Proteinuria: UPCR 1280 mg/g 2024 increased from 250 mg/g 2024   - Albuminuria: UACR 850 mg/g 2025 increased from 100 mg/g 2024  - Current Rx: RAASi (olmesartan 40 mg, thiazide diuretic (chlorthalidone 25 mg 3 times weekly)  - Changes: No changes  - Further evaluation as above  Stage 1 chronic kidney disease  - Management as above   Benign hypertension with CKD (chronic kidney disease) stage I  - Target Goal: Less than 120/80 per KDIGO guidelines   - Volume status: Euvolemic  - Status: Blood pressure above goal (ran out of antihypertensives 1 week ago)  - Current antihypertensive regimen: Olmesartan 40 mg daily, amlodipine 10 mg daily,  Flomax 0.4 mg daily, chlorthalidone 25 mg twice weekly   - Changes: Refills provided, resume antihypertensive regimen and follow blood pressure  Renal cyst  - 2.4 cm simple cyst in the upper pole of the left kidney  - No further work-up needed      History of Present Illness   HPI  Matthew Mancilla is a 65 y.o. male    Since last visit: His pharmacy closed and was not able to take his antihypertensives for last 1 week.  He denies dyspnea.  He denies leg swelling.  He denies any trouble with urination.     From initial consultation: Matthew Mancilla has history of hypertension, hyperlipidemia, microscopic polyangiitis, mononeuritis multiplex, episode of acute kidney injury/RPGN requiring dialysis, steroid-induced hyperglycemia, left above-knee amputation in 2016 presents for regular follow-up.  He had kidney biopsy when he had episode of acute kidney injury requiring dialysis in 2014/2015.  He also required steroids, cyclophosphamide and plasma exchange at that time.  Since then he has had good renal recovery with most recent serum creatinine within normal limits.  He is currently maintained on azathioprine per rheumatology.  He is currently following up with rheumatology.    History obtained from: patient    Review of Systems   Constitutional:  Negative for chills and fever.   HENT:  Negative for ear pain and sore throat.    Eyes:  Negative for pain and visual disturbance.   Respiratory:  Negative for cough and shortness of breath.    Cardiovascular:  Negative for chest pain and palpitations.   Gastrointestinal:  Negative for abdominal pain and vomiting.   Genitourinary:  Negative for dysuria and hematuria.   Musculoskeletal:  Negative for arthralgias and back pain.   Skin:  Negative for color change and rash.   Neurological:  Negative for seizures and syncope.   All other systems reviewed and are negative.    Past Medical History   Past Medical History:   Diagnosis Date    ESRD (end stage renal disease) (HCC)     from  "vasculitis- now good gfr. off of HD    Hyperlipidemia     Hypertension     Hyperthyroidism      Past Surgical History:   Procedure Laterality Date    AMPUTATION ABOVE KNEE (AKA)      CHOLECYSTECTOMY      IR CHOLANGIOGRAM/PTC WITHOUT DRAIN  3/12/2014    TONSILLECTOMY      US GUIDANCE  1/30/2014     Family History   Problem Relation Age of Onset    Cirrhosis Father         liver       reports that he has been smoking cigarettes. He has never used smokeless tobacco. He reports that he does not currently use alcohol. He reports that he does not currently use drugs.  Current Outpatient Medications   Medication Instructions    amLODIPine (NORVASC) 10 mg, Oral, Daily    aspirin 81 mg, Daily    atorvastatin (LIPITOR) 40 mg, Every 24 hours    atorvastatin (LIPITOR) 80 mg, Oral, Daily    azaTHIOprine (IMURAN) 50 mg, Oral, 2 times daily    chlorthalidone 25 mg, Oral, 3 times weekly    MULTIPLE VITAMIN PO Every 24 hours    Naproxen Sodium (ALEVE) 220 mg, Daily at bedtime    olmesartan (BENICAR) 40 mg, Oral, Daily    pregabalin (LYRICA) 100 mg, Oral, 2 times daily    tamsulosin (FLOMAX) 0.4 mg, Oral, Every 24 hours    VITAMIN D PO 1,000 Units, Every 24 hours   Allergies[1]      Objective   BP (!) 176/96 (BP Location: Left arm, Patient Position: Sitting, Cuff Size: Adult)   Pulse 56   Ht 6' 1\" (1.854 m)   Wt 81.2 kg (179 lb)   SpO2 98%   BMI 23.62 kg/m²      Physical Exam  Vitals and nursing note reviewed.   Constitutional:       General: He is not in acute distress.     Appearance: He is well-developed.   HENT:      Head: Normocephalic and atraumatic.     Eyes:      Conjunctiva/sclera: Conjunctivae normal.       Cardiovascular:      Rate and Rhythm: Normal rate and regular rhythm.      Pulses: Normal pulses.      Heart sounds: Normal heart sounds. No murmur heard.  Pulmonary:      Effort: Pulmonary effort is normal. No respiratory distress.      Breath sounds: Normal breath sounds.   Abdominal:      Palpations: Abdomen is " soft.      Tenderness: There is no abdominal tenderness.     Musculoskeletal:         General: No swelling.      Cervical back: Neck supple.      Right lower leg: No edema.      Comments: Left lower extremity amputation noted     Skin:     General: Skin is warm and dry.      Capillary Refill: Capillary refill takes less than 2 seconds.     Neurological:      Mental Status: He is alert.     Psychiatric:         Mood and Affect: Mood normal.                [1]   Allergies  Allergen Reactions    Amoxicillin-Pot Clavulanate Other (See Comments)

## 2025-05-19 NOTE — ASSESSMENT & PLAN NOTE
- Proteinuria: UPCR 1280 mg/g 05/2024 increased from 250 mg/g 03/2024   - Albuminuria: UACR 850 mg/g 05/2025 increased from 100 mg/g 03/2024  - Current Rx: RAASi (olmesartan 40 mg, thiazide diuretic (chlorthalidone 25 mg 3 times weekly)  - Changes: No changes  - Further evaluation as above

## 2025-05-19 NOTE — ASSESSMENT & PLAN NOTE
- Patient has history of SHONNA/RPGN secondary to microscopic polyangiitis in 2014/2015 status post kidney biopsy at that time and briefly required dialysis  - Patient also had plasma exchange, steroids and cyclophosphamide at that time  - Since then he had significant renal recovery with most recent creatinine 0.81/GFR 98 05/2024  - Urinalysis: 0-2 RBC, 0-5 WBC 05/2025  - Proteinuria: UPCR 1280 mg/g 05/2024 increased from 250 mg/g 03/2024   - Albuminuria: UACR 850 mg/g 05/2025 increased from 100 mg/g 03/2024  - C-ANCA 1: 640 02/2024, [place]-3 antibody 21.9 05/2025 overall improved from 28.9 03/2024, 30 03/2023 and 40 07/2022, MPO negative  - Hep B/hep C negative as of 07/2022  - He is currently maintained on azathioprine per rheumatology  - Given increase in albuminuria/proteinuria, will do serological workup including C3/C4/KAYLEE/dsDNA/SPEP/UPEP/serum free light chain ratio  - Low threshold for kidney biopsy for further evaluation  - Would need better blood pressure control prior to kidney biopsy if needed

## 2025-05-19 NOTE — PATIENT INSTRUCTIONS
Your kidney function is stable, however amount of protein leakage in the urine has increased from before.    We will do more blood and urine test now to look for the cause of this.    If no cause identified, we may plan for a kidney biopsy.    Refills have been sent for your blood pressure medications to your pharmacy.    We will discuss on the phone after reviewing lab work.

## 2025-05-23 DIAGNOSIS — R80.1 PERSISTENT PROTEINURIA: Primary | ICD-10-CM

## 2025-06-06 ENCOUNTER — DOCUMENTATION (OUTPATIENT)
Dept: RHEUMATOLOGY | Facility: CLINIC | Age: 65
End: 2025-06-06